# Patient Record
Sex: FEMALE | Race: WHITE | Employment: OTHER | URBAN - METROPOLITAN AREA
[De-identification: names, ages, dates, MRNs, and addresses within clinical notes are randomized per-mention and may not be internally consistent; named-entity substitution may affect disease eponyms.]

---

## 2022-08-04 ENCOUNTER — APPOINTMENT (OUTPATIENT)
Dept: GENERAL RADIOLOGY | Age: 87
End: 2022-08-04
Payer: MEDICARE

## 2022-08-04 ENCOUNTER — APPOINTMENT (OUTPATIENT)
Dept: CT IMAGING | Age: 87
End: 2022-08-04
Payer: MEDICARE

## 2022-08-04 ENCOUNTER — HOSPITAL ENCOUNTER (EMERGENCY)
Age: 87
Discharge: HOME | End: 2022-08-04
Attending: EMERGENCY MEDICINE
Payer: MEDICARE

## 2022-08-04 VITALS
DIASTOLIC BLOOD PRESSURE: 83 MMHG | BODY MASS INDEX: 18.52 KG/M2 | HEIGHT: 55 IN | HEART RATE: 66 BPM | TEMPERATURE: 97.6 F | WEIGHT: 80 LBS | RESPIRATION RATE: 18 BRPM | OXYGEN SATURATION: 96 % | SYSTOLIC BLOOD PRESSURE: 143 MMHG

## 2022-08-04 DIAGNOSIS — S06.0X1A CONCUSSION WITH LOSS OF CONSCIOUSNESS OF 30 MINUTES OR LESS, INITIAL ENCOUNTER: Primary | ICD-10-CM
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
## 2022-08-04 DIAGNOSIS — M21.822 HILL SACHS DEFORMITY, LEFT: ICD-10-CM

## 2022-08-04 DIAGNOSIS — S12.9XXA COMPRESSION FRACTURE OF CERVICAL VERTEBRA, UNSPECIFIED CERVICAL VERTEBRAL LEVEL, INITIAL ENCOUNTER (HCC): ICD-10-CM

## 2022-08-04 DIAGNOSIS — M25.512 ACUTE PAIN OF LEFT SHOULDER: ICD-10-CM

## 2022-08-04 PROCEDURE — 6370000000 HC RX 637 (ALT 250 FOR IP): Performed by: EMERGENCY MEDICINE

## 2022-08-04 PROCEDURE — 73562 X-RAY EXAM OF KNEE 3: CPT

## 2022-08-04 PROCEDURE — 72125 CT NECK SPINE W/O DYE: CPT

## 2022-08-04 PROCEDURE — 99284 EMERGENCY DEPT VISIT MOD MDM: CPT

## 2022-08-04 PROCEDURE — 73080 X-RAY EXAM OF ELBOW: CPT

## 2022-08-04 PROCEDURE — 73030 X-RAY EXAM OF SHOULDER: CPT

## 2022-08-04 PROCEDURE — 70450 CT HEAD/BRAIN W/O DYE: CPT

## 2022-08-04 RX ORDER — HYDROCODONE BITARTRATE AND ACETAMINOPHEN 5; 325 MG/1; MG/1
1 TABLET ORAL ONCE
Status: COMPLETED | OUTPATIENT
Start: 2022-08-04 | End: 2022-08-04

## 2022-08-04 RX ORDER — METOPROLOL SUCCINATE 50 MG/1
50 TABLET, EXTENDED RELEASE ORAL DAILY
COMMUNITY

## 2022-08-04 RX ORDER — HYDROCODONE BITARTRATE AND ACETAMINOPHEN 5; 325 MG/1; MG/1
1 TABLET ORAL EVERY 6 HOURS PRN
Qty: 6 TABLET | Refills: 0 | Status: SHIPPED | OUTPATIENT
Start: 2022-08-04 | End: 2022-08-07

## 2022-08-04 RX ORDER — SPIRONOLACTONE 50 MG/1
50 TABLET, FILM COATED ORAL DAILY
COMMUNITY

## 2022-08-04 RX ADMIN — HYDROCODONE BITARTRATE AND ACETAMINOPHEN 1 TABLET: 5; 325 TABLET ORAL at 20:01

## 2022-08-04 ASSESSMENT — ENCOUNTER SYMPTOMS
ABDOMINAL PAIN: 0
SHORTNESS OF BREATH: 0
CHEST TIGHTNESS: 0

## 2022-08-04 ASSESSMENT — PAIN DESCRIPTION - LOCATION: LOCATION: SHOULDER

## 2022-08-04 ASSESSMENT — PAIN - FUNCTIONAL ASSESSMENT: PAIN_FUNCTIONAL_ASSESSMENT: NONE - DENIES PAIN

## 2022-08-04 ASSESSMENT — PAIN SCALES - GENERAL: PAINLEVEL_OUTOF10: 4

## 2022-08-04 NOTE — ED TRIAGE NOTES
[place] mall, wheel caught rug and pt fell and hit head on floor. little pain at left shoulder denies pain elsewhere however daughter states she will always deny pain. Abrasions noted to left elbow and knee.

## 2022-08-04 NOTE — ED PROVIDER NOTES
66-year-old female presenting after a fall that occurred. She was being wheeled on her wheeled walker by her daughter. The wheel caught on a rug at [facility]. Patient fell and struck the left side of her head. She has an abrasion left elbow and pain the left knee as well. Awake, alert and oriented x4. Not complaining of anything now but when directly asked about pain she does have some. She is awake and alert now. This new problem, persistent, severity, ongoing pain for about an hour to     No family history on file. No past surgical history on file. Review of Systems   Constitutional:  Negative for chills and fever. HENT:          Left-sided head injury   Respiratory:  Negative for chest tightness and shortness of breath. Cardiovascular:  Negative for chest pain. Gastrointestinal:  Negative for abdominal pain. Musculoskeletal:         Left elbow and the abrasions, pain to the left shoulder   All other systems reviewed and are negative. Physical Exam  Constitutional:       General: She is not in acute distress. Appearance: She is well-developed. HENT:      Head: Normocephalic. Comments: Ecchymotic area to left side of the head  Eyes:      Conjunctiva/sclera: Conjunctivae normal.      Pupils: Pupils are equal, round, and reactive to light. Neck:      Thyroid: No thyromegaly. Cardiovascular:      Rate and Rhythm: Normal rate and regular rhythm. Pulmonary:      Effort: Pulmonary effort is normal. No respiratory distress. Breath sounds: Normal breath sounds. Abdominal:      General: There is no distension. Palpations: Abdomen is soft. Tenderness: There is no abdominal tenderness. There is no guarding or rebound. Musculoskeletal:         General: Tenderness present. Cervical back: Normal range of motion.       Comments: Tenderness to the shoulder, abrasion to left elbow without significant pain, abrasion left knee without significant pain   Skin: General: Skin is warm and dry. Findings: No erythema. Neurological:      Mental Status: She is alert and oriented to person, place, and time. Cranial Nerves: No cranial nerve deficit. Coordination: Coordination normal.        Procedures     MDM              --------------------------------------------- PAST HISTORY ---------------------------------------------  Past Medical History:  has a past medical history of Cerebral artery occlusion with cerebral infarction ([facility] and [facility] Past Surgical History:  has no past surgical history on file. Social History:  reports that she has never smoked. She has never used smokeless tobacco. She reports that she does not drink alcohol and does not use drugs. Family History: family history is not on file. The patients home medications have been reviewed. Allergies: Patient has no known allergies. -------------------------------------------------- RESULTS -------------------------------------------------  Labs:  No results found for this visit on 08/04/22. Radiology:  CT HEAD WO CONTRAST   Final Result   No acute intracranial abnormality. There is age-appropriate atrophy,   small-vessel ischemic disease and multiple old lacunar CVAs which include   with caudate nucleus and right basal ganglia. CT CERVICAL SPINE WO CONTRAST   Final Result   Mild-moderate T1 and T4 as well as mild T3 anterior compression fractures,   which are age indeterminate. No unstable cervical spine fracture identified. Moderate-severe cervical spondylosis. XR SHOULDER LEFT (MIN 2 VIEWS)   Final Result   1. No acute abnormality. 2. 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 3. Demineralized bones. 4. Findings suggestive of chronic rotator cuff tears.          XR KNEE LEFT (3 VIEWS)   Final Result   Addendum (preliminary) 1 of 1   ADDENDUM:   Please disregard previous report which is for the left elbow. Radiographs of the left knee reveal no evidence of fracture or joint   dislocation. Mild loss of joint space is noted at the patellofemoral    joint. Mild chondrocalcinosis noted in the tibiofemoral joints. Final   No acute osseous abnormality. IMPRESSION:   1. No fracture or joint dislocation is seen in the left knee. 2. Degenerative changes, as described. XR ELBOW LEFT (MIN 3 VIEWS)   Final Result   No fracture or joint dislocation.             ------------------------- NURSING NOTES AND VITALS REVIEWED ---------------------------  Date / Time Roomed:  8/4/2022  4:39 PM  ED Bed Assignment:  13/13    The nursing notes within the ED encounter and vital signs as below have been reviewed. BP (!) 143/83   Pulse 66   Temp 97.6 °F (36.4 °C) (Oral)   Resp 18   Ht 4' 5\" (1.346 m)   Wt 80 lb (36.3 kg)   LMP  (LMP Unknown)   SpO2 96%   BMI 20.02 kg/m²   Oxygen Saturation Interpretation: Normal      ------------------------------------------ PROGRESS NOTES ------------------------------------------  I have spoken with the patient and daughter  and discussed todays results, in addition to providing specific details for the plan of care and counseling regarding the diagnosis and prognosis. Their questions are answered at this time and they are agreeable with the plan. I discussed at length with them reasons for immediate return here for re evaluation. They will followup with primary care by calling their office tomorrow. --------------------------------- ADDITIONAL PROVIDER NOTES ---------------------------------  At this time the patient is without objective evidence of an acute process requiring hospitalization or inpatient management. They have remained hemodynamically stable throughout their entire ED visit and are stable for discharge with outpatient follow-up.      The plan has been discussed in detail and they are aware of the specific conditions for emergent return, as well as the importance of follow-up. Discharge Medication List as of 8/4/2022  8:41 PM        START taking these medications    Details   HYDROcodone-acetaminophen (NORCO) 5-325 MG per tablet Take 1 tablet by mouth every 6 hours as needed for Pain for up to 3 days. , Disp-6 tablet, R-0Normal             Diagnosis:  1. Concussion with loss of consciousness of 30 minutes or less, initial encounter    2. Fall, initial encounter    3. Compression fracture of cervical vertebra, unspecified cervical vertebral level, initial encounter ([facility]    4. Acute pain of left shoulder    5. Hill Sachs deformity, left        Disposition:  Patient's disposition: Discharge to home  Patient's condition is stable.          Autumn Guthrie DO  08/04/22 1257

## 2022-08-12 ENCOUNTER — OFFICE VISIT (OUTPATIENT)
Dept: FAMILY MEDICINE CLINIC | Age: 87
End: 2022-08-12
Payer: MEDICARE

## 2022-08-12 VITALS
HEIGHT: 55 IN | TEMPERATURE: 98.4 F | HEART RATE: 69 BPM | SYSTOLIC BLOOD PRESSURE: 118 MMHG | OXYGEN SATURATION: 95 % | DIASTOLIC BLOOD PRESSURE: 62 MMHG | RESPIRATION RATE: 17 BRPM | WEIGHT: 78.2 LBS | BODY MASS INDEX: 18.1 KG/M2

## 2022-08-12 DIAGNOSIS — J44.9 CHRONIC OBSTRUCTIVE PULMONARY DISEASE, UNSPECIFIED COPD TYPE (HCC): ICD-10-CM

## 2022-08-12 DIAGNOSIS — S06.0X1D CONCUSSION WITH LOSS OF CONSCIOUSNESS OF 30 MINUTES OR LESS, SUBSEQUENT ENCOUNTER: Primary | ICD-10-CM

## 2022-08-12 PROCEDURE — 1123F ACP DISCUSS/DSCN MKR DOCD: CPT | Performed by: FAMILY MEDICINE

## 2022-08-12 PROCEDURE — 99203 OFFICE O/P NEW LOW 30 MIN: CPT | Performed by: FAMILY MEDICINE

## 2022-08-12 RX ORDER — FUROSEMIDE 40 MG/1
TABLET ORAL
COMMUNITY
Start: 2022-05-19

## 2022-08-12 RX ORDER — BUDESONIDE 0.5 MG/2ML
INHALANT ORAL
COMMUNITY
Start: 2022-05-19

## 2022-08-12 RX ORDER — ALBUTEROL SULFATE 90 UG/1
2 AEROSOL, METERED RESPIRATORY (INHALATION) EVERY 6 HOURS PRN
Qty: 18 G | Refills: 0 | Status: SHIPPED | OUTPATIENT
Start: 2022-08-12

## 2022-08-12 RX ORDER — ALBUTEROL SULFATE 90 UG/1
AEROSOL, METERED RESPIRATORY (INHALATION)
COMMUNITY
Start: 2021-09-28 | End: 2022-08-12 | Stop reason: SDUPTHER

## 2022-08-12 RX ORDER — IPRATROPIUM BROMIDE AND ALBUTEROL SULFATE 2.5; .5 MG/3ML; MG/3ML
SOLUTION RESPIRATORY (INHALATION)
COMMUNITY
Start: 2022-05-24

## 2022-08-12 RX ORDER — PHENOL 1.4 %
AEROSOL, SPRAY (ML) MUCOUS MEMBRANE
COMMUNITY

## 2022-08-12 SDOH — ECONOMIC STABILITY: FOOD INSECURITY: WITHIN THE PAST 12 MONTHS, YOU WORRIED THAT YOUR FOOD WOULD RUN OUT BEFORE YOU GOT MONEY TO BUY MORE.: NEVER TRUE

## 2022-08-12 SDOH — ECONOMIC STABILITY: FOOD INSECURITY: WITHIN THE PAST 12 MONTHS, THE FOOD YOU BOUGHT JUST DIDN'T LAST AND YOU DIDN'T HAVE MONEY TO GET MORE.: NEVER TRUE

## 2022-08-12 ASSESSMENT — ENCOUNTER SYMPTOMS
CHEST TIGHTNESS: 0
EYE DISCHARGE: 0
SINUS PAIN: 0
DIARRHEA: 0
NAUSEA: 0
TROUBLE SWALLOWING: 0
ABDOMINAL PAIN: 0
SORE THROAT: 0
WHEEZING: 0
SHORTNESS OF BREATH: 0
COUGH: 1
CONSTIPATION: 0
EYE REDNESS: 0

## 2022-08-12 ASSESSMENT — PATIENT HEALTH QUESTIONNAIRE - PHQ9
SUM OF ALL RESPONSES TO PHQ QUESTIONS 1-9: 0
SUM OF ALL RESPONSES TO PHQ QUESTIONS 1-9: 0
2. FEELING DOWN, DEPRESSED OR HOPELESS: 0
SUM OF ALL RESPONSES TO PHQ QUESTIONS 1-9: 0
1. LITTLE INTEREST OR PLEASURE IN DOING THINGS: 0
SUM OF ALL RESPONSES TO PHQ QUESTIONS 1-9: 0
SUM OF ALL RESPONSES TO PHQ9 QUESTIONS 1 & 2: 0

## 2022-08-12 ASSESSMENT — SOCIAL DETERMINANTS OF HEALTH (SDOH): HOW HARD IS IT FOR YOU TO PAY FOR THE VERY BASICS LIKE FOOD, HOUSING, MEDICAL CARE, AND HEATING?: NOT HARD AT ALL

## 2022-08-12 NOTE — PROGRESS NOTES
22  Doug Gordon : 1935 Sex: female  Age: 80 y.o. Chief Complaint   Patient presents with    ED Follow-up     Sarina Fletcherow out of wheelchair and hit head    New Patient       HPI this pleasant 26-year-old woman is here today brought in by her daughter as a follow-up to a concussion with loss of consciousness injury on . The emergency room she had negative CT of the head and neck but the ER doctor recommended she have a follow-up MRI. At this point she is still complaining of headaches and her daughter says that she is restless and her sleep is affected. I will go ahead and order the MRI. I also refilled her albuterol which she takes for her COPD at times. The original injury was seen at [facility] [place] [facility], they live in [place] which is about an hour away or almost an hour. They told me they are going to get a physician closer to home, apparently the call center gave them this appointment here. Review of Systems   Constitutional:  Positive for fatigue. Negative for diaphoresis, fever and unexpected weight change. HENT:  Negative for congestion, ear discharge, sinus pain, sore throat and trouble swallowing. Eyes:  Negative for discharge and redness. Respiratory:  Positive for cough. Negative for chest tightness, shortness of breath and wheezing. Cardiovascular:  Negative for chest pain, palpitations and leg swelling. Gastrointestinal:  Negative for abdominal pain, constipation, diarrhea and nausea. Endocrine: Negative for polydipsia and polyuria. Genitourinary:  Negative for dysuria, flank pain, frequency and urgency. Musculoskeletal:  Negative for arthralgias and myalgias. Skin:  Negative for rash. Allergic/Immunologic: Negative for immunocompromised state. Neurological:  Positive for headaches. Negative for dizziness, syncope and numbness. Hematological:  Does not bruise/bleed easily.    Psychiatric/Behavioral:  Negative for sleep disturbance and suicidal ideas. The patient is not nervous/anxious. Physical Exam  HENT:      Head: Normocephalic. Nose: Nose normal.      Mouth/Throat:      Mouth: Mucous membranes are moist.   Eyes:      Extraocular Movements: Extraocular movements intact. Pupils: Pupils are equal, round, and reactive to light. Cardiovascular:      Rate and Rhythm: Normal rate. Pulses: Normal pulses. Pulmonary:      Effort: Pulmonary effort is normal.      Breath sounds: Normal breath sounds. Skin:     General: Skin is warm and dry. Neurological:      Mental Status: She is alert and oriented to person, place, and time. Cranial Nerves: No cranial nerve deficit. Psychiatric:         Behavior: Behavior normal.       Assessment and Plan:  Violet was seen today for ed follow-up and new patient. Diagnoses and all orders for this visit:    Concussion with loss of consciousness of 30 minutes or less, subsequent encounter  -     MRI BRAIN WO CONTRAST; Future    Chronic obstructive pulmonary disease, unspecified COPD type ([facility] [address]    Other orders  -     albuterol sulfate HFA (PROVENTIL;VENTOLIN;PROAIR) 108 (90 Base) MCG/ACT inhaler; Inhale 2 puffs into the lungs every 6 hours as needed for Wheezing          Discussions/Education provided to patients during visit:  [] Discussed the importance to stop smoking. [] Advised to monitor eating habits. [] Reviewed and discussed Imaging results. [] Reviewed and discussed Lab results. [] Discussed the importance of drinking plenty of fluids. [] Cut down on Salt, Caffeine, and Sugar. [x] Continue Medications as Discussed. [x] Communicated with patient any concerns, to phone office. No follow-ups on file.       Seen By:  Twila Duron,

## 2022-08-27 ENCOUNTER — HOSPITAL ENCOUNTER (OUTPATIENT)
Age: 87
Discharge: HOME | End: 2022-08-27
Payer: MEDICARE

## 2022-08-27 ENCOUNTER — HOSPITAL ENCOUNTER (OUTPATIENT)
Dept: GENERAL RADIOLOGY | Age: 87
Discharge: HOME | End: 2022-08-29
Payer: MEDICARE

## 2022-08-27 ENCOUNTER — HOSPITAL ENCOUNTER (OUTPATIENT)
Age: 87
Discharge: HOME | End: 2022-08-29
Payer: MEDICARE

## 2022-08-27 DIAGNOSIS — R05.9 COUGH: ICD-10-CM

## 2022-08-27 PROCEDURE — 93005 ELECTROCARDIOGRAM TRACING: CPT | Performed by: INTERNAL MEDICINE

## 2022-08-27 PROCEDURE — 71046 X-RAY EXAM CHEST 2 VIEWS: CPT

## 2022-08-28 LAB
EKG ATRIAL RATE: 66 BPM
EKG P AXIS: 80 DEGREES
EKG P-R INTERVAL: 182 MS
EKG Q-T INTERVAL: 426 MS
EKG QRS DURATION: 92 MS
EKG QTC CALCULATION (BAZETT): 446 MS
EKG R AXIS: -16 DEGREES
EKG T AXIS: 89 DEGREES
EKG VENTRICULAR RATE: 66 BPM

## 2022-09-22 ENCOUNTER — TELEPHONE (OUTPATIENT)
Dept: FAMILY MEDICINE CLINIC | Age: 87
End: 2022-09-22

## 2022-09-22 NOTE — TELEPHONE ENCOUNTER
Patient's daughter, Alfonso Hinton, called on behalf of patient and herself, informing they just moved to the area and would like to know if they could both schedule w/Dr. Jai Groves. I informed that I will need to send a msg to Dr. Christy Maloney, asking if he will accept them both. Juancho Pierce was agreeable. Juancho Pierce was not in the [facility]) system, therefore, I directed her to the Call Center to create a chart.

## 2022-09-23 NOTE — TELEPHONE ENCOUNTER
I returned patient's daughter, Kylah, call and informed OK to schedule both. I informed that Dr. Dominic Price will not prescribe any controlled medications for pain, anxiety or sleep. Adriana Fuller informed she takes Clonazepam.  I informed that Dr. Dominic Price will not prescribe this. Adriana Fuller stated she will look into another PCP.

## 2022-09-29 ENCOUNTER — OFFICE VISIT (OUTPATIENT)
Dept: PRIMARY CARE CLINIC | Age: 87
End: 2022-09-29
Payer: MEDICARE

## 2022-09-29 VITALS
DIASTOLIC BLOOD PRESSURE: 50 MMHG | BODY MASS INDEX: 18.03 KG/M2 | SYSTOLIC BLOOD PRESSURE: 110 MMHG | HEIGHT: 55 IN | TEMPERATURE: 97 F | WEIGHT: 77.9 LBS | HEART RATE: 65 BPM | OXYGEN SATURATION: 96 %

## 2022-09-29 DIAGNOSIS — Z86.79 HISTORY OF CORONARY ARTERY DISEASE: ICD-10-CM

## 2022-09-29 DIAGNOSIS — I10 ESSENTIAL HYPERTENSION: ICD-10-CM

## 2022-09-29 DIAGNOSIS — J44.9 CHRONIC OBSTRUCTIVE PULMONARY DISEASE, UNSPECIFIED COPD TYPE (HCC): ICD-10-CM

## 2022-09-29 DIAGNOSIS — R41.89 COGNITIVE IMPAIRMENT: Primary | ICD-10-CM

## 2022-09-29 DIAGNOSIS — R01.1 HEART MURMUR, SYSTOLIC: ICD-10-CM

## 2022-09-29 DIAGNOSIS — R41.89 COGNITIVE IMPAIRMENT: ICD-10-CM

## 2022-09-29 LAB
BACTERIA: ABNORMAL /HPF
BASOPHILS ABSOLUTE: 0.03 E9/L (ref 0–0.2)
BASOPHILS RELATIVE PERCENT: 0.4 % (ref 0–2)
BILIRUBIN URINE: NEGATIVE
BLOOD, URINE: NEGATIVE
CLARITY: ABNORMAL
COLOR: YELLOW
CRYSTALS, UA: ABNORMAL /HPF
EOSINOPHILS ABSOLUTE: 0.16 E9/L (ref 0.05–0.5)
EOSINOPHILS RELATIVE PERCENT: 2.1 % (ref 0–6)
GLUCOSE URINE: NEGATIVE MG/DL
HCT VFR BLD CALC: 39.5 % (ref 34–48)
HEMOGLOBIN: 13.1 G/DL (ref 11.5–15.5)
IMMATURE GRANULOCYTES #: 0.03 E9/L
IMMATURE GRANULOCYTES %: 0.4 % (ref 0–5)
KETONES, URINE: ABNORMAL MG/DL
LEUKOCYTE ESTERASE, URINE: ABNORMAL
LYMPHOCYTES ABSOLUTE: 1.43 E9/L (ref 1.5–4)
LYMPHOCYTES RELATIVE PERCENT: 18.4 % (ref 20–42)
MCH RBC QN AUTO: 33.4 PG (ref 26–35)
MCHC RBC AUTO-ENTMCNC: 33.2 % (ref 32–34.5)
MCV RBC AUTO: 100.8 FL (ref 80–99.9)
MONOCYTES ABSOLUTE: 0.83 E9/L (ref 0.1–0.95)
MONOCYTES RELATIVE PERCENT: 10.7 % (ref 2–12)
NEUTROPHILS ABSOLUTE: 5.3 E9/L (ref 1.8–7.3)
NEUTROPHILS RELATIVE PERCENT: 68 % (ref 43–80)
NITRITE, URINE: NEGATIVE
PDW BLD-RTO: 13.3 FL (ref 11.5–15)
PH UA: 6 (ref 5–9)
PLATELET # BLD: 251 E9/L (ref 130–450)
PMV BLD AUTO: 10.1 FL (ref 7–12)
PROTEIN UA: NEGATIVE MG/DL
RBC # BLD: 3.92 E12/L (ref 3.5–5.5)
RBC UA: ABNORMAL /HPF (ref 0–2)
SPECIFIC GRAVITY UA: 1.02 (ref 1–1.03)
UROBILINOGEN, URINE: 0.2 E.U./DL
WBC # BLD: 7.8 E9/L (ref 4.5–11.5)
WBC UA: ABNORMAL /HPF (ref 0–5)

## 2022-09-29 PROCEDURE — 99205 OFFICE O/P NEW HI 60 MIN: CPT | Performed by: INTERNAL MEDICINE

## 2022-09-29 PROCEDURE — 81003 URINALYSIS AUTO W/O SCOPE: CPT | Performed by: INTERNAL MEDICINE

## 2022-09-29 PROCEDURE — 1123F ACP DISCUSS/DSCN MKR DOCD: CPT | Performed by: INTERNAL MEDICINE

## 2022-09-29 RX ORDER — DONEPEZIL HYDROCHLORIDE 10 MG/1
10 TABLET, FILM COATED ORAL NIGHTLY
Qty: 30 TABLET | Refills: 0 | Status: SHIPPED | OUTPATIENT
Start: 2022-09-29

## 2022-09-29 NOTE — PROGRESS NOTES
Chief Complaint   Patient presents with    New Patient     To be established here locally was being seen in [address] [facility]. Dr. Fernanda Lopes Cardiology   1. Would like referral to local cardiology       HPI:  Patient is here to be established as a new patient she moved to this area from [facility]  Patient has a history of COPD, from secondhand smoke and according to the daughter progressive supra nuclear palsy a CVA . Daughter told me that cardiologist saw her as an outpatient looked at her EKG and told them that she had a heart attack while she was in the hospital before. Patient denies any chest pain or shortness of breath  Patient apparently fell on August 4 while getting up from her wheelchair to the left side, hit her head was out for few minutes was evaluated at [facility] facility emergency room. .    Past Medical History, Surgical History, and Family History has been reviewed and updated.     Review of Systems:  Constitutional:  No fever, no fatigue, no chills, no headaches, no weight change  Dermatology:  No rash, no mole, no dry or sensitive skin  ENT:  No cough, no sore throat, no sinus pain, no runny nose, no ear pain  Cardiology:  No chest pain, no palpitations, no leg edema, no shortness of breath, no PND  Gastroenterology:  No dysphagia, no abdominal pain, no nausea, no vomiting, no constipation, no diarrhea, no heartburn  Musculoskeletal:  No joint pain, no leg cramps, no back pain, no muscle aches  Respiratory:  No shortness of breath, no orthopnea, no wheezing, no DORSEY, no hemoptysis  Urology:  No blood in the urine, no urinary frequency, no urinary incontinence, no urinary urgency, no nocturia, no dysuria    Vitals:    09/29/22 1141   BP: (!) 110/50   Site: Right Upper Arm   Position: Sitting   Cuff Size: Large Adult   Pulse: 65   Temp: 97 °F (36.1 °C)   SpO2: 96%   Weight: 77 lb 14.4 oz (35.3 kg)   Height: 4' 5\" (1.346 m)       General:  Patient alert pleasant and oriented date and year unable to recall the name of the president can only recall one of her 4 kids name and number of her grandchildren names  HEENT:  Atraumatic, normocephalic, PERRLA, EOMI, clear conjunctiva, TMs clear, nose-clear, throat - no erythema  Neck:  Supple, no goiter, no carotid bruits, no LAD  Lungs: Bilateral inspiratory fibrotic rales no wheezes. Resonant to percussion  Heart:  RRR, + ejection systolic murmur at the right and left sternal border, NO gallops or rubs  Abdomen:  Soft/nt/nd, + bowel sounds  Extremities:  No clubbing, cyanosis or edema  Neuro exam: Muscle wasting in 4 extremities. Positive finger-to-nose test on the right side. Can only walk with assistance  Cranial nerves intact  Skin: unremarkable    No results found for: LABA1C, CHOL, TRIG, HDL, LDLCHOLESTEROL, LDLCALC, LABVLDL, VLDL, CHOLHDLRATIO, NA, K, CL, CO2, BUN, CREATININE, GLUCOSE, CALCIUM, PROT, LABALBU, BILITOT, ALKPHOS, AST, ALT, LABGLOM, GFRAA, AGRATIO, GLOB     MRI BRAIN WO CONTRAST    Result Date: 8/31/2022  EXAMINATION: MRI OF THE BRAIN WITHOUT CONTRAST  8/31/2022 5:35 pm TECHNIQUE: Multiplanar multisequence MRI of the brain was performed without the administration of intravenous contrast. COMPARISON: CT head without contrast, 08/04/2022 HISTORY: ORDERING SYSTEM PROVIDED HISTORY: Concussion with loss of consciousness of 30 minutes or less, subsequent encounter TECHNOLOGIST PROVIDED HISTORY: Reason for exam:->continued headache, FINDINGS: INTRACRANIAL STRUCTURES/VENTRICLES: There is no acute infarct. No mass effect, edema or hemorrhage is seen. Mild-to-moderate cerebral volume loss is seen with moderate chronic microvascular ischemic changes. 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 No hydrocephalus or extra-axial fluid is seen. ORBITS: Prosthetic lenses are seen in the globes bilaterally. The orbits are otherwise grossly unremarkable.  SINUSES: The visualized paranasal sinuses and mastoid air cells demonstrate no acute abnormality. BONES/SOFT TISSUES: The bone marrow signal intensity appears normal. The soft tissues demonstrate no acute abnormality. 1.  No acute intracranial abnormality. 2. Mild-to-moderate cerebral volume loss with moderate chronic microvascular ischemic changes. 3. Vertebrobasilar dolichoectasia. RECOMMENDATIONS: Unavailable        Assessment/Plan:    Cognitive impairment. MMSE done scored 12 out of 30. Start Aricept 10 mg tablet daily  COPD  Essential hypertension controlled  History of CVA  History of coronary artery disease      Outpatient Encounter Medications as of 9/29/2022   Medication Sig Dispense Refill    donepezil (ARICEPT) 10 MG tablet Take 1 tablet by mouth nightly 30 tablet 0    budesonide (PULMICORT) 0.5 MG/2ML nebulizer suspension USE 1 VIAL IN NEBULIZER TWICE DAILY      furosemide (LASIX) 40 MG tablet TAKE 1 TABLET BY MOUTH TWICE DAILY      ipratropium-albuterol (DUONEB) 0.5-2.5 (3) MG/3ML SOLN nebulizer solution USE 1 AMPULE IN NEBULIZER EVERY 6 HOURS AS NEEDED FOR SHORTNESS OF BREATH      Melatonin 10 MG TABS Take by mouth      albuterol sulfate HFA (PROVENTIL;VENTOLIN;PROAIR) 108 (90 Base) MCG/ACT inhaler Inhale 2 puffs into the lungs every 6 hours as needed for Wheezing 18 g 0    metoprolol succinate (TOPROL XL) 50 MG extended release tablet Take 50 mg by mouth in the morning. spironolactone (ALDACTONE) 50 MG tablet Take 50 mg by mouth in the morning. No facility-administered encounter medications on file as of 9/29/2022. Violet was seen today for new patient. Diagnoses and all orders for this visit:    Cognitive impairment  -     donepezil (ARICEPT) 10 MG tablet; Take 1 tablet by mouth nightly  -     CBC with Auto Differential; Future  -     Comprehensive Metabolic Panel; Future  -     TSH; Future    History of coronary artery disease  -     Yadira Saleh MD, Cardiology, [facility]  -     Echocardiogram complete; Future  -     LIPID PANEL;  Future  - Urinalysis; Future    Heart murmur, systolic  -     Jelly Osborne MD, Cardiology, [place]  -     Echocardiogram complete; Future    Chronic obstructive pulmonary disease, unspecified [facility]  -     CBC with Auto Differential; Future  -     Comprehensive Metabolic Panel; Future         There are no Patient Instructions on file for this visit.            Lawson Pallas, MD   9/29/22

## 2022-09-30 LAB
ALBUMIN SERPL-MCNC: 3.9 G/DL (ref 3.5–5.2)
ALP BLD-CCNC: 73 U/L (ref 35–104)
ALT SERPL-CCNC: 15 U/L (ref 0–32)
ANION GAP SERPL CALCULATED.3IONS-SCNC: 11 MMOL/L (ref 7–16)
AST SERPL-CCNC: 22 U/L (ref 0–31)
BILIRUB SERPL-MCNC: 0.3 MG/DL (ref 0–1.2)
BUN BLDV-MCNC: 21 MG/DL (ref 6–23)
CALCIUM SERPL-MCNC: 9.7 MG/DL (ref 8.6–10.2)
CHLORIDE BLD-SCNC: 103 MMOL/L (ref 98–107)
CHOLESTEROL, TOTAL: 119 MG/DL (ref 0–199)
CO2: 26 MMOL/L (ref 22–29)
CREAT SERPL-MCNC: 0.9 MG/DL (ref 0.5–1)
GFR AFRICAN AMERICAN: >60
GFR NON-AFRICAN AMERICAN: 59 ML/MIN/1.73
GLUCOSE BLD-MCNC: 96 MG/DL (ref 74–99)
HDLC SERPL-MCNC: 40 MG/DL
LDL CHOLESTEROL CALCULATED: 65 MG/DL (ref 0–99)
POTASSIUM SERPL-SCNC: 4.9 MMOL/L (ref 3.5–5)
SODIUM BLD-SCNC: 140 MMOL/L (ref 132–146)
TOTAL PROTEIN: 6.4 G/DL (ref 6.4–8.3)
TRIGL SERPL-MCNC: 70 MG/DL (ref 0–149)
TSH SERPL DL<=0.05 MIU/L-ACNC: 1.88 UIU/ML (ref 0.27–4.2)
VLDLC SERPL CALC-MCNC: 14 MG/DL

## 2022-11-22 ENCOUNTER — TELEPHONE (OUTPATIENT)
Dept: PRIMARY CARE CLINIC | Age: 87
End: 2022-11-22

## 2022-11-22 NOTE — TELEPHONE ENCOUNTER
----- Message from Aidan Salguero sent at 11/22/2022 12:50 PM EST -----  Subject: Results Request    QUESTIONS  Results: Would like results of Mri of the brain to be sent to her   cardiologist phone: [phone] (Dr. Jasmyne Minaya) they would   also like the results interpreted over the phone to them as well. ;   Ordered by:   Date Performed:   ---------------------------------------------------------------------------  --------------  Meredith DUNBAR    [phone]; OK to leave message on voicemail  ---------------------------------------------------------------------------  --------------

## 2023-03-17 ENCOUNTER — APPOINTMENT (OUTPATIENT)
Dept: GENERAL RADIOLOGY | Age: 88
End: 2023-03-17
Payer: MEDICARE

## 2023-03-17 ENCOUNTER — HOSPITAL ENCOUNTER (EMERGENCY)
Age: 88
Discharge: LEFT BEFORE DISCHARGE | End: 2023-03-17
Attending: EMERGENCY MEDICINE
Payer: MEDICARE

## 2023-03-17 ENCOUNTER — APPOINTMENT (OUTPATIENT)
Dept: CT IMAGING | Age: 88
End: 2023-03-17
Payer: MEDICARE

## 2023-03-17 VITALS
WEIGHT: 69.8 LBS | SYSTOLIC BLOOD PRESSURE: 120 MMHG | TEMPERATURE: 98 F | DIASTOLIC BLOOD PRESSURE: 62 MMHG | HEART RATE: 72 BPM | HEIGHT: 55 IN | RESPIRATION RATE: 20 BRPM | OXYGEN SATURATION: 98 % | BODY MASS INDEX: 16.15 KG/M2

## 2023-03-17 DIAGNOSIS — E86.0 DEHYDRATION: ICD-10-CM

## 2023-03-17 DIAGNOSIS — R41.0 CONFUSION: ICD-10-CM

## 2023-03-17 DIAGNOSIS — Z53.29 LEFT AGAINST MEDICAL ADVICE: Primary | ICD-10-CM

## 2023-03-17 DIAGNOSIS — N17.9 AKI (ACUTE KIDNEY INJURY) (HCC): ICD-10-CM

## 2023-03-17 LAB
ALBUMIN SERPL-MCNC: 4.1 G/DL (ref 3.5–5.2)
ALP SERPL-CCNC: 105 U/L (ref 35–104)
ALT SERPL-CCNC: 17 U/L (ref 0–32)
ANION GAP SERPL CALCULATED.3IONS-SCNC: 12 MMOL/L (ref 7–16)
AST SERPL-CCNC: 23 U/L (ref 0–31)
BACTERIA URNS QL MICRO: ABNORMAL /HPF
BASOPHILS # BLD: 0.03 E9/L (ref 0–0.2)
BASOPHILS NFR BLD: 0.4 % (ref 0–2)
BILIRUB SERPL-MCNC: 0.3 MG/DL (ref 0–1.2)
BILIRUB UR QL STRIP: NEGATIVE
BUN SERPL-MCNC: 66 MG/DL (ref 6–23)
CALCIUM SERPL-MCNC: 9.6 MG/DL (ref 8.6–10.2)
CHLORIDE SERPL-SCNC: 105 MMOL/L (ref 98–107)
CLARITY UR: CLEAR
CO2 SERPL-SCNC: 25 MMOL/L (ref 22–29)
COLOR UR: YELLOW
CREAT SERPL-MCNC: 1.5 MG/DL (ref 0.5–1)
EOSINOPHIL # BLD: 0.08 E9/L (ref 0.05–0.5)
EOSINOPHIL NFR BLD: 1 % (ref 0–6)
EPI CELLS #/AREA URNS HPF: ABNORMAL /HPF
ERYTHROCYTE [DISTWIDTH] IN BLOOD BY AUTOMATED COUNT: 12.7 FL (ref 11.5–15)
GLUCOSE SERPL-MCNC: 212 MG/DL (ref 74–99)
GLUCOSE UR STRIP-MCNC: NEGATIVE MG/DL
HCT VFR BLD AUTO: 41.8 % (ref 34–48)
HGB BLD-MCNC: 13.6 G/DL (ref 11.5–15.5)
HGB UR QL STRIP: NEGATIVE
HYALINE CASTS #/AREA URNS LPF: ABNORMAL /LPF (ref 0–2)
IMM GRANULOCYTES # BLD: 0.05 E9/L
IMM GRANULOCYTES NFR BLD: 0.6 % (ref 0–5)
KETONES UR STRIP-MCNC: NEGATIVE MG/DL
LEUKOCYTE ESTERASE UR QL STRIP: NEGATIVE
LYMPHOCYTES # BLD: 0.86 E9/L (ref 1.5–4)
LYMPHOCYTES NFR BLD: 10.9 % (ref 20–42)
MCH RBC QN AUTO: 33.2 PG (ref 26–35)
MCHC RBC AUTO-ENTMCNC: 32.5 % (ref 32–34.5)
MCV RBC AUTO: 102 FL (ref 80–99.9)
MONOCYTES # BLD: 0.62 E9/L (ref 0.1–0.95)
MONOCYTES NFR BLD: 7.9 % (ref 2–12)
NEUTROPHILS # BLD: 6.25 E9/L (ref 1.8–7.3)
NEUTS SEG NFR BLD: 79.2 % (ref 43–80)
NITRITE UR QL STRIP: NEGATIVE
PH UR STRIP: 5.5 [PH] (ref 5–9)
PLATELET # BLD AUTO: 238 E9/L (ref 130–450)
PMV BLD AUTO: 10.2 FL (ref 7–12)
POTASSIUM SERPL-SCNC: 3.6 MMOL/L (ref 3.5–5)
PROT SERPL-MCNC: 6.5 G/DL (ref 6.4–8.3)
PROT UR STRIP-MCNC: NEGATIVE MG/DL
RBC # BLD AUTO: 4.1 E12/L (ref 3.5–5.5)
RBC #/AREA URNS HPF: ABNORMAL /HPF (ref 0–2)
SODIUM SERPL-SCNC: 142 MMOL/L (ref 132–146)
SP GR UR STRIP: 1.01 (ref 1–1.03)
TROPONIN, HIGH SENSITIVITY: 34 NG/L (ref 0–9)
TROPONIN, HIGH SENSITIVITY: 40 NG/L (ref 0–9)
UROBILINOGEN UR STRIP-ACNC: 0.2 E.U./DL
WBC # BLD: 7.9 E9/L (ref 4.5–11.5)
WBC #/AREA URNS HPF: ABNORMAL /HPF (ref 0–5)

## 2023-03-17 PROCEDURE — 99285 EMERGENCY DEPT VISIT HI MDM: CPT

## 2023-03-17 PROCEDURE — 71045 X-RAY EXAM CHEST 1 VIEW: CPT

## 2023-03-17 PROCEDURE — 2580000003 HC RX 258: Performed by: STUDENT IN AN ORGANIZED HEALTH CARE EDUCATION/TRAINING PROGRAM

## 2023-03-17 PROCEDURE — 70450 CT HEAD/BRAIN W/O DYE: CPT

## 2023-03-17 PROCEDURE — 85025 COMPLETE CBC W/AUTO DIFF WBC: CPT

## 2023-03-17 PROCEDURE — 96360 HYDRATION IV INFUSION INIT: CPT

## 2023-03-17 PROCEDURE — 81001 URINALYSIS AUTO W/SCOPE: CPT

## 2023-03-17 PROCEDURE — 2580000003 HC RX 258

## 2023-03-17 PROCEDURE — 80053 COMPREHEN METABOLIC PANEL: CPT

## 2023-03-17 PROCEDURE — 84484 ASSAY OF TROPONIN QUANT: CPT

## 2023-03-17 RX ORDER — SODIUM CHLORIDE 0.9 % (FLUSH) 0.9 %
SYRINGE (ML) INJECTION
Status: COMPLETED
Start: 2023-03-17 | End: 2023-03-17

## 2023-03-17 RX ORDER — 0.9 % SODIUM CHLORIDE 0.9 %
500 INTRAVENOUS SOLUTION INTRAVENOUS ONCE
Status: COMPLETED | OUTPATIENT
Start: 2023-03-17 | End: 2023-03-17

## 2023-03-17 RX ORDER — FLUTICASONE PROPIONATE AND SALMETEROL 250; 50 UG/1; UG/1
1 POWDER RESPIRATORY (INHALATION) EVERY 12 HOURS
COMMUNITY

## 2023-03-17 RX ADMIN — SODIUM CHLORIDE 500 ML: 9 INJECTION, SOLUTION INTRAVENOUS at 19:06

## 2023-03-17 RX ADMIN — Medication: at 18:31

## 2023-03-17 ASSESSMENT — LIFESTYLE VARIABLES
HOW OFTEN DO YOU HAVE A DRINK CONTAINING ALCOHOL: NEVER
HOW MANY STANDARD DRINKS CONTAINING ALCOHOL DO YOU HAVE ON A TYPICAL DAY: PATIENT DOES NOT DRINK

## 2023-03-17 NOTE — ED PROVIDER NOTES
Name: Adamaris Johnson    MRN: [id number]     Date / Time Roomed:  3/17/2023  3:52 PM  ED Bed Assignment:  05/05    ------------------ History of Present Illness --------------------  3/17/23, Time: 5:01 PM EDT   Chief Complaint   Patient presents with    Altered Mental Status     Pt's family states she has become more disoriented, states she has pain in her neck and her back. HPI    Adamaris Johnson is a 80 y.o. female, with hx of CHF, stroke, RA, COPD, who presents to the ED today for increased confusion over the past 2 weeks. Patient does live with her daughter, her daughter is currently present. Daughter states that she has seemed to be more confused than her usual, has been urinating more more frequently. Patient has had some back pain over the past few weeks as well, however has been ambulating at her baseline. Patient is confused on questioning, unable to give year however was able to give her birthday and knows her daughter's name. Review of systems felt somewhat unreliable from patient, however she denies any complaints at this time. Daughter states she did have a fall about 4 to 5 weeks ago. The pt denies other ROS at this time. PCP: Abel Mondragon MD.    -------------------- PMH --------------------    Past Medical History:   has a past medical history of Cerebral artery occlusion with cerebral infarction [facility]), CHF (congestive heart failure) ([facility] [facility] COPD (chronic obstructive pulmonary disease) ([facility] [address] [facility] Rheumatoid arthritis ([facility] and Stroke ([facility] Surgical History:  No past surgical history on file. Social History:  Social History     Tobacco Use    Smoking status: Never    Smokeless tobacco: Never   Vaping Use    Vaping Use: Never used   Substance Use Topics    Alcohol use: Never    Drug use: Never       Family History:  No family history on file. Allergies: Other    The patients past medical history has been reviewed.     -------------------- Current Meds --------------------  Discharge Medication List as of 3/17/2023  9:15 PM        CONTINUE these medications which have NOT CHANGED    Details   fluticasone-salmeterol (ADVAIR DISKUS) 250-50 MCG/ACT AEPB diskus inhaler Inhale 1 puff into the lungs every 12 hoursHistorical Med      donepezil (ARICEPT) 10 MG tablet Take 1 tablet by mouth nightly, Disp-30 tablet, R-0Normal      furosemide (LASIX) 40 MG tablet Take 40 mg by mouth 2 times dailyHistorical Med      ipratropium-albuterol (DUONEB) 0.5-2.5 (3) MG/3ML SOLN nebulizer solution USE 1 AMPULE IN NEBULIZER EVERY 6 HOURS AS NEEDED FOR SHORTNESS OF BREATHHistorical Med      Melatonin 10 MG TABS Take by mouthHistorical Med      albuterol sulfate HFA (PROVENTIL;VENTOLIN;PROAIR) 108 (90 Base) MCG/ACT inhaler Inhale 2 puffs into the lungs every 6 hours as needed for Wheezing, Disp-18 g, R-0Normal      metoprolol succinate (TOPROL XL) 50 MG extended release tablet Take 50 mg by mouth in the morning. Historical Med      spironolactone (ALDACTONE) 25 MG tablet Take 25 mg by mouth dailyHistorical Med             The patients home medications have been reviewed. -------------------- PE --------------------  Physical Exam  Constitutional:       General: She is not in acute distress. Appearance: Normal appearance. She is normal weight. She is not ill-appearing, toxic-appearing or diaphoretic. HENT:      Head: Normocephalic and atraumatic. Right Ear: External ear normal.      Left Ear: External ear normal.      Nose: Nose normal. No rhinorrhea. Mouth/Throat:      Pharynx: Oropharynx is clear. Eyes:      General: No scleral icterus. Right eye: No discharge. Left eye: No discharge. Extraocular Movements: Extraocular movements intact. Conjunctiva/sclera: Conjunctivae normal.      Pupils: Pupils are equal, round, and reactive to light. Cardiovascular:      Rate and Rhythm: Normal rate and regular rhythm. Pulses: Normal pulses.    Pulmonary:      Effort: Pulmonary effort is normal. No respiratory distress. Breath sounds: Normal breath sounds. No stridor. Abdominal:      General: Abdomen is flat. There is no distension. Palpations: Abdomen is soft. Tenderness: There is no abdominal tenderness. There is no guarding. Musculoskeletal:         General: No swelling or tenderness. Normal range of motion. Cervical back: Normal range of motion. Right lower leg: No edema. Left lower leg: No edema. Comments: No midline back tenderness. No outward signs of trauma. Patient moving head and extremities without difficulty or apparent pain. Patient was ambulating with her walker. Skin:     General: Skin is warm and dry. Capillary Refill: Capillary refill takes less than 2 seconds. Coloration: Skin is not jaundiced. Findings: No erythema or rash. Neurological:      General: No focal deficit present. Mental Status: She is alert and oriented to person, place, and time. GCS: GCS eye subscore is 4. GCS verbal subscore is 4. GCS motor subscore is 6. Motor: No weakness. Psychiatric:         Mood and Affect: Mood normal. Mood is not anxious. Behavior: Behavior normal. Behavior is not agitated. --------------- External Imaging -------------    -------------------- Procedures --------------------    -------------------- MDM --------------------    /62   Pulse 72   Temp 98 °F (36.7 °C)   Resp 20   Ht 4' 4\" (1.321 m)   Wt 69 lb 12.8 oz (31.7 kg)   LMP  (LMP Unknown)   SpO2 98%   BMI 18.15 kg/m²       Diagnoses considered, but not limited to, include urinary tract infection, metabolic derangement, dehydration, cardiac etiology, intracranial abnormality. Labwork ordered and interpretation by myself. Details below. Imaging ordered and interpretations by myself and radiologist. Details below.     Labs Reviewed   URINALYSIS WITH MICROSCOPIC - Abnormal; Notable for the following components: Result Value    Bacteria, UA FEW (*)     All other components within normal limits   TROPONIN - Abnormal; Notable for the following components:    Troponin, High Sensitivity 40 (*)     All other components within normal limits   CBC WITH AUTO DIFFERENTIAL - Abnormal; Notable for the following components:    .0 (*)     Lymphocytes % 10.9 (*)     Lymphocytes Absolute 0.86 (*)     All other components within normal limits   COMPREHENSIVE METABOLIC PANEL - Abnormal; Notable for the following components:    Glucose 212 (*)     BUN 66 (*)     Creatinine 1.5 (*)     Alkaline Phosphatase 105 (*)     All other components within normal limits   TROPONIN - Abnormal; Notable for the following components:    Troponin, High Sensitivity 34 (*)     All other components within normal limits     As interpreted by me, the above displayed labs are abnormal. All other labs obtained during this visit were within normal range or not returned as of this dictation. ED Course as of 03/18/23 1708   Fri Mar 17, 2023   1834 CT HEAD WO CONTRAST  IMPRESSION:     1. No evidence of acute intracranial process. 2.  Findings of presumed small vessel ischemic deep white matter disease. 3.  Old lacunar infarct within the head of the right caudate nucleus. 4.  Prominence of the sulci and/or CSF spaces suggests a degree of cerebral  atrophy. [PW]   1844 Creatinine(!): 1.5  Prerenal ART, possibly some mild dehydration. [PW]   1845 Electrolytes balanced. Normal liver function. No leukocytosis, no anemia. UA [PW]   1845 UA negative. [PW]   2463 Initial Trope 40, second pending. [PW]   1940 Troponin, High Sensitivity(!): 34  Positive delta trop   [PW]   1950 Spoke with Dr. Segundo Barfield, hospitalist  [PW]   2101 Patient's daughter is wanting to take her back home at this time, does not want her to be admitted, she states they want to follow-up with her own doctors tomorrow and they do not wish to be admitted to the hospital this time. Patient to be leaving AMA, it was recommended that she be admitted here for further care however she is adamantly denying this, risks associated with leaving were discussed and daughter is  understanding. [PW]      ED Course User Index  [PW] Jamshidpaul Trevino DO          Medications given include:  Medications   sodium chloride flush 0.9 % injection (  Given 3/17/23 1831)   0.9 % sodium chloride bolus (0 mLs IntraVENous Stopped 3/17/23 1945)       EKG Interpretation  Interpreted by emergency department physician.     3/17/23  Time: 1741    Rate: 72  Axis: Normal  CO: 196  QRS: 96  Qtc: 457  Rhythm: Regular  Clinical Impression: Normal sinus rhythm, nonspecific T wave abnormalities  Comparison to old EKG: stable as compared to pt's most recent     -------------------- Consults --------------------  IP CONSULT TO INTERNAL MEDICINE    -------------------- RESULTS --------------------    Labs:  Results for orders placed or performed during the hospital encounter of 03/17/23   Urinalysis with Microscopic   Result Value Ref Range    Color, UA Yellow Straw/Yellow    Clarity, UA Clear Clear    Glucose, Ur Negative Negative mg/dL    Bilirubin Urine Negative Negative    Ketones, Urine Negative Negative mg/dL    Specific Gravity, UA 1.015 1.005 - 1.030    Blood, Urine Negative Negative    pH, UA 5.5 5.0 - 9.0    Protein, UA Negative Negative mg/dL    Urobilinogen, Urine 0.2 <2.0 E.U./dL    Nitrite, Urine Negative Negative    Leukocyte Esterase, Urine Negative Negative    Hyaline Casts, UA 0-2 0 - 2 /LPF    WBC, UA 2-5 0 - 5 /HPF    RBC, UA NONE 0 - 2 /HPF    Epithelial Cells, UA RARE /HPF    Bacteria, UA FEW (A) None Seen /HPF   Troponin   Result Value Ref Range    Troponin, High Sensitivity 40 (H) 0 - 9 ng/L   CBC with Auto Differential   Result Value Ref Range    WBC 7.9 4.5 - 11.5 E9/L    RBC 4.10 3.50 - 5.50 E12/L    Hemoglobin 13.6 11.5 - 15.5 g/dL    Hematocrit 41.8 34.0 - 48.0 %    .0 (H) 80.0 - 99.9 fL    MCH 33.2 26.0 - 35.0 pg    MCHC 32.5 32.0 - 34.5 %    RDW 12.7 11.5 - 15.0 fL    Platelets 238 130 - 450 E9/L    MPV 10.2 7.0 - 12.0 fL    Neutrophils % 79.2 43.0 - 80.0 %    Immature Granulocytes % 0.6 0.0 - 5.0 %    Lymphocytes % 10.9 (L) 20.0 - 42.0 %    Monocytes % 7.9 2.0 - 12.0 %    Eosinophils % 1.0 0.0 - 6.0 %    Basophils % 0.4 0.0 - 2.0 %    Neutrophils Absolute 6.25 1.80 - 7.30 E9/L    Immature Granulocytes # 0.05 E9/L    Lymphocytes Absolute 0.86 (L) 1.50 - 4.00 E9/L    Monocytes Absolute 0.62 0.10 - 0.95 E9/L    Eosinophils Absolute 0.08 0.05 - 0.50 E9/L    Basophils Absolute 0.03 0.00 - 0.20 E9/L   CMP   Result Value Ref Range    Sodium 142 132 - 146 mmol/L    Potassium 3.6 3.5 - 5.0 mmol/L    Chloride 105 98 - 107 mmol/L    CO2 25 22 - 29 mmol/L    Anion Gap 12 7 - 16 mmol/L    Glucose 212 (H) 74 - 99 mg/dL    BUN 66 (H) 6 - 23 mg/dL    Creatinine 1.5 (H) 0.5 - 1.0 mg/dL    Est, Glom Filt Rate 33 >=60 mL/min/1.73    Calcium 9.6 8.6 - 10.2 mg/dL    Total Protein 6.5 6.4 - 8.3 g/dL    Albumin 4.1 3.5 - 5.2 g/dL    Total Bilirubin 0.3 0.0 - 1.2 mg/dL    Alkaline Phosphatase 105 (H) 35 - 104 U/L    ALT 17 0 - 32 U/L    AST 23 0 - 31 U/L   Troponin   Result Value Ref Range    Troponin, High Sensitivity 34 (H) 0 - 9 ng/L   EKG 12 Lead   Result Value Ref Range    Ventricular Rate 72 BPM    Atrial Rate 72 BPM    P-R Interval 196 ms    QRS Duration 96 ms    Q-T Interval 418 ms    QTc Calculation (Bazett) 457 ms    P Axis 71 degrees    R Axis -10 degrees    T Axis 78 degrees         Radiology:   Non-plain film images such as CT, Ultrasound and MRI are read by the radiologist. Plain radiographic images are visualized and preliminarily interpreted by the ED Provider in the MDM section.    Interpretation per the Radiologist below, if available at the time of this note:    CT HEAD WO CONTRAST   Final Result      1.  No evidence of acute intracranial process.      2.  Findings of presumed small vessel ischemic deep  white matter disease. 3.  Old lacunar infarct within the head of the right caudate nucleus. 4.  Prominence of the sulci and/or CSF spaces suggests a degree of cerebral   atrophy. XR CHEST PORTABLE   Final Result   No acute process. No results found. No results found.    ------------------- NURSING NOTES & VITALS -------------------    The nursing notes within the ED encounter and vital signs were reviewed. Vitals:    03/17/23 2035   BP: 120/62   Pulse: 72   Resp: 20   Temp:    SpO2: 98%        No data found.        ------------- Final Impression, Disposition & Plan ---------------    Final Impression:   1. Left against medical advice    2. Confusion    3. Dehydration    4. ART (acute kidney injury) ([facility] [address]        Disposition:  [place] 03/17/2023 09:05:11 PM    PATIENT REFERRED TO:  Abel Mondragon MD  [address] [facility]  [phone]    Call in 1 day  For follow up    DISCHARGE MEDICATIONS:  Discharge Medication List as of 3/17/2023  9:15 PM               Please note that portions of this note were completed with a voice recognition program.    Efforts were made to edit the dictations but occasionally words are mis-transcribed.     Latha Yeager DO (electronically signed)       Latha Yeager DO  Resident  03/18/23 1880

## 2023-03-18 LAB
EKG ATRIAL RATE: 72 BPM
EKG P AXIS: 71 DEGREES
EKG P-R INTERVAL: 196 MS
EKG Q-T INTERVAL: 418 MS
EKG QRS DURATION: 96 MS
EKG QTC CALCULATION (BAZETT): 457 MS
EKG R AXIS: -10 DEGREES
EKG T AXIS: 78 DEGREES
EKG VENTRICULAR RATE: 72 BPM

## 2023-03-18 NOTE — ED NOTES
Patients daughter wants to sign out patient AMA. Physician resident aware and in talking with patient.       Doris Jimenez RN  03/17/23 2910

## 2023-03-18 NOTE — ED PROVIDER NOTES
ATTENDING PROVIDER ATTESTATION:     Gareht Cassidy presented to the emergency department for evaluation of Altered Mental Status (Pt's family states she has become more disoriented, states she has pain in her neck and her back. )   and was initially evaluated by the Medical Resident. See Original ED Note for H&P and ED course above. I have reviewed and discussed the case, including pertinent history (medical, surgical, family and social) and exam findings with the Medical Resident assigned to Gareth Cassidy. I have personally performed and/or participated in the history, exam, medical decision making, and procedures and agree with all pertinent clinical information and any additional changes or corrections are noted below in my assessment and plan. I have discussed this patient in detail with the resident, and provided the instruction and education,       I have reviewed my findings and recommendations with the assigned Medical Resident, Gareth Cassidy and members of family present at the time of disposition. My Assessment/Plan: The following summarizes my clinical findings and independent assessment:     History:  Gareth Cassidy is a 80 y.o. female presenting to the ED for confusion, beginning a few days ago. The complaint has been intermittent, moderate in severity, and worsened by nothing. The patient did have a fall earlier today but she slid back down the wall without significant injury. She did not lose conscious. She remote fall several weeks ago where she was unconscious for 3 minutes. There is been no change in medication or diet. There is been no significant cough fever or chills. There has been some urinary frequency.             Review of Systems:   A complete review of systems was performed and pertinent positives and negatives are stated within HPI, all other systems reviewed and are negative.    --------------------------------------------- PAST HISTORY ---------------------------------------------  Past Medical History:  has a past medical history of Cerebral artery occlusion with cerebral infarction ([facility] CHF (congestive heart failure) ([facility] COPD (chronic obstructive pulmonary disease) ([facility] Rheumatoid arthritis ([facility] and Stroke ([facility] Past Surgical History:  has no past surgical history on file. Social History:  reports that she has never smoked. She has never used smokeless tobacco. She reports that she does not drink alcohol and does not use drugs. Family History: family history is not on file. The patients home medications have been reviewed. Allergies: Other  --------------------------------------------------------------------------------------------------------------  Nursing notes and vital signs reviewed          On My Exam:    Constitutional/General: Alert and oriented x3  Head: Normocephalic and atraumatic  Eyes: PERRL, EOMI, conjunctiva normal, sclera non icteric  Mouth: Oropharynx clear, handling secretions, no trismus, no asymmetry of the posterior oropharynx or uvular edema  Neck: Supple, full ROM, no stridor, no meningeal signs  Respiratory: Lungs clear to auscultation bilaterally, no wheezes, rales, or rhonchi. Not in respiratory distress  Cardiovascular:  Regular rate. Regular rhythm. No murmurs, no aortic murmurs, no gallops, or rubs. 2+ distal pulses. Equal extremity pulses. Chest: No chest wall tenderness  GI:  Abdomen Soft, Non tender, Non distended. +BS. No rebound, guarding, or rigidity. No pulsatile masses. Musculoskeletal: Moves all extremities x 4. Warm and well perfused, no clubbing, cyanosis, or edema. Capillary refill <3 seconds  Integument: skin warm and dry. No rashes.    Neurologic: GCS 15, no focal deficits, symmetric strength 5/5 in the upper and lower extremities bilaterally  Psychiatric: Normal Affect        Medical Decision Making:  Patient is soft-spoken and allows her daughter to make decisions for her.  I have explained the daughter in detail and showed her on the computer screen the change from her renal function in September until today. I suggest the patient would benefit admission and IV hydration to ensure her kidney functions improved. The daughter is adamant refusal and will take her mother home. She has promised to give her oral fluids at home and will follow-up with her primary care doctor in the morning. I suggested if she changed her mind and would like her mother to be admitted she was to return at any time. DIAGNOSIS:  1. Left against medical advice    2. Confusion    3. Dehydration    4.  ART (acute kidney injury) ([facility]                         Dominique Hartman MD  03/17/23 3007

## 2023-03-18 NOTE — ED NOTES
Patient in room, with family at bedside requesting to talk with physician regarding leaving and being discharged and not being admitted. Physician aware.      Judy Mancera RN  03/17/23 3920

## 2025-07-02 ENCOUNTER — HOSPITAL ENCOUNTER (INPATIENT)
Age: 89
LOS: 1 days | Discharge: HOSPICE | DRG: 062 | End: 2025-07-03
Attending: EMERGENCY MEDICINE | Admitting: STUDENT IN AN ORGANIZED HEALTH CARE EDUCATION/TRAINING PROGRAM
Payer: MEDICARE

## 2025-07-02 ENCOUNTER — APPOINTMENT (OUTPATIENT)
Dept: GENERAL RADIOLOGY | Age: 89
DRG: 062 | End: 2025-07-02
Payer: MEDICARE

## 2025-07-02 ENCOUNTER — APPOINTMENT (OUTPATIENT)
Dept: CT IMAGING | Age: 89
DRG: 062 | End: 2025-07-02
Payer: MEDICARE

## 2025-07-02 DIAGNOSIS — I63.9 CEREBROVASCULAR ACCIDENT (CVA), UNSPECIFIED MECHANISM (HCC): ICD-10-CM

## 2025-07-02 DIAGNOSIS — I63.9 ACUTE ISCHEMIC STROKE (HCC): Primary | ICD-10-CM

## 2025-07-02 LAB
ABO + RH BLD: NORMAL
ALBUMIN SERPL-MCNC: 3.4 G/DL (ref 3.5–5.2)
ALP SERPL-CCNC: 131 U/L (ref 35–104)
ALT SERPL-CCNC: 11 U/L (ref 0–35)
ANION GAP SERPL CALCULATED.3IONS-SCNC: 11 MMOL/L (ref 7–16)
ARM BAND NUMBER: NORMAL
AST SERPL-CCNC: 23 U/L (ref 0–35)
BASOPHILS # BLD: 0.04 K/UL (ref 0–0.2)
BASOPHILS NFR BLD: 1 % (ref 0–2)
BILIRUB SERPL-MCNC: 0.4 MG/DL (ref 0–1.2)
BLOOD BANK SAMPLE EXPIRATION: NORMAL
BLOOD GROUP ANTIBODIES SERPL: NEGATIVE
BUN SERPL-MCNC: 22 MG/DL (ref 8–23)
CALCIUM SERPL-MCNC: 8.8 MG/DL (ref 8.8–10.2)
CHLORIDE SERPL-SCNC: 107 MMOL/L (ref 98–107)
CHOLEST SERPL-MCNC: 126 MG/DL
CO2 SERPL-SCNC: 24 MMOL/L (ref 22–29)
CREAT SERPL-MCNC: 0.9 MG/DL (ref 0.5–1)
EKG ATRIAL RATE: 94 BPM
EKG P AXIS: 70 DEGREES
EKG P-R INTERVAL: 204 MS
EKG Q-T INTERVAL: 350 MS
EKG QRS DURATION: 110 MS
EKG QTC CALCULATION (BAZETT): 437 MS
EKG R AXIS: -32 DEGREES
EKG T AXIS: 123 DEGREES
EKG VENTRICULAR RATE: 94 BPM
EOSINOPHIL # BLD: 0.27 K/UL (ref 0.05–0.5)
EOSINOPHILS RELATIVE PERCENT: 3 % (ref 0–6)
ERYTHROCYTE [DISTWIDTH] IN BLOOD BY AUTOMATED COUNT: 13.3 % (ref 11.5–15)
GFR, ESTIMATED: 64 ML/MIN/1.73M2
GLUCOSE BLD-MCNC: 163 MG/DL
GLUCOSE BLD-MCNC: 163 MG/DL (ref 74–99)
GLUCOSE BLD-MCNC: 203 MG/DL (ref 74–99)
GLUCOSE SERPL-MCNC: 219 MG/DL (ref 74–99)
HBA1C MFR BLD: 5.6 % (ref 4–5.6)
HCT VFR BLD AUTO: 37.4 % (ref 34–48)
HDLC SERPL-MCNC: 45 MG/DL
HGB BLD-MCNC: 12.3 G/DL (ref 11.5–15.5)
IMM GRANULOCYTES # BLD AUTO: 0.03 K/UL (ref 0–0.58)
IMM GRANULOCYTES NFR BLD: 0 % (ref 0–5)
INR PPP: 1.1
LDLC SERPL CALC-MCNC: 69 MG/DL
LYMPHOCYTES NFR BLD: 2.67 K/UL (ref 1.5–4)
LYMPHOCYTES RELATIVE PERCENT: 33 % (ref 20–42)
MCH RBC QN AUTO: 32.1 PG (ref 26–35)
MCHC RBC AUTO-ENTMCNC: 32.9 G/DL (ref 32–34.5)
MCV RBC AUTO: 97.7 FL (ref 80–99.9)
MONOCYTES NFR BLD: 0.73 K/UL (ref 0.1–0.95)
MONOCYTES NFR BLD: 9 % (ref 2–12)
NEUTROPHILS NFR BLD: 54 % (ref 43–80)
NEUTS SEG NFR BLD: 4.48 K/UL (ref 1.8–7.3)
PARTIAL THROMBOPLASTIN TIME: 29.2 SEC (ref 24.5–35.1)
PLATELET # BLD AUTO: 186 K/UL (ref 130–450)
PMV BLD AUTO: 9.8 FL (ref 7–12)
POTASSIUM SERPL-SCNC: 3.7 MMOL/L (ref 3.5–5.1)
PROT SERPL-MCNC: 5.6 G/DL (ref 6.4–8.3)
PROTHROMBIN TIME: 12.2 SEC (ref 9.3–12.4)
RBC # BLD AUTO: 3.83 M/UL (ref 3.5–5.5)
SODIUM SERPL-SCNC: 142 MMOL/L (ref 136–145)
TRIGL SERPL-MCNC: 59 MG/DL
TROPONIN I SERPL HS-MCNC: 23 NG/L (ref 0–14)
TSH SERPL DL<=0.05 MIU/L-ACNC: 2.01 UIU/ML (ref 0.27–4.2)
VLDLC SERPL CALC-MCNC: 12 MG/DL
WBC OTHER # BLD: 8.2 K/UL (ref 4.5–11.5)

## 2025-07-02 PROCEDURE — 2500000003 HC RX 250 WO HCPCS: Performed by: EMERGENCY MEDICINE

## 2025-07-02 PROCEDURE — 83036 HEMOGLOBIN GLYCOSYLATED A1C: CPT

## 2025-07-02 PROCEDURE — 86850 RBC ANTIBODY SCREEN: CPT

## 2025-07-02 PROCEDURE — 86901 BLOOD TYPING SEROLOGIC RH(D): CPT

## 2025-07-02 PROCEDURE — 2700000000 HC OXYGEN THERAPY PER DAY

## 2025-07-02 PROCEDURE — 93005 ELECTROCARDIOGRAM TRACING: CPT | Performed by: EMERGENCY MEDICINE

## 2025-07-02 PROCEDURE — 70496 CT ANGIOGRAPHY HEAD: CPT

## 2025-07-02 PROCEDURE — 85025 COMPLETE CBC W/AUTO DIFF WBC: CPT

## 2025-07-02 PROCEDURE — 93010 ELECTROCARDIOGRAM REPORT: CPT | Performed by: INTERNAL MEDICINE

## 2025-07-02 PROCEDURE — 3E03317 INTRODUCTION OF OTHER THROMBOLYTIC INTO PERIPHERAL VEIN, PERCUTANEOUS APPROACH: ICD-10-PCS | Performed by: STUDENT IN AN ORGANIZED HEALTH CARE EDUCATION/TRAINING PROGRAM

## 2025-07-02 PROCEDURE — 2500000003 HC RX 250 WO HCPCS: Performed by: STUDENT IN AN ORGANIZED HEALTH CARE EDUCATION/TRAINING PROGRAM

## 2025-07-02 PROCEDURE — 99222 1ST HOSP IP/OBS MODERATE 55: CPT | Performed by: STUDENT IN AN ORGANIZED HEALTH CARE EDUCATION/TRAINING PROGRAM

## 2025-07-02 PROCEDURE — 2000000000 HC ICU R&B

## 2025-07-02 PROCEDURE — 71045 X-RAY EXAM CHEST 1 VIEW: CPT

## 2025-07-02 PROCEDURE — 84443 ASSAY THYROID STIM HORMONE: CPT

## 2025-07-02 PROCEDURE — 80053 COMPREHEN METABOLIC PANEL: CPT

## 2025-07-02 PROCEDURE — 4A03X5D MEASUREMENT OF ARTERIAL FLOW, INTRACRANIAL, EXTERNAL APPROACH: ICD-10-PCS | Performed by: STUDENT IN AN ORGANIZED HEALTH CARE EDUCATION/TRAINING PROGRAM

## 2025-07-02 PROCEDURE — 0042T CT BRAIN PERFUSION: CPT

## 2025-07-02 PROCEDURE — 80061 LIPID PANEL: CPT

## 2025-07-02 PROCEDURE — 6360000002 HC RX W HCPCS: Performed by: STUDENT IN AN ORGANIZED HEALTH CARE EDUCATION/TRAINING PROGRAM

## 2025-07-02 PROCEDURE — 84484 ASSAY OF TROPONIN QUANT: CPT

## 2025-07-02 PROCEDURE — 96374 THER/PROPH/DIAG INJ IV PUSH: CPT

## 2025-07-02 PROCEDURE — 70450 CT HEAD/BRAIN W/O DYE: CPT

## 2025-07-02 PROCEDURE — 99285 EMERGENCY DEPT VISIT HI MDM: CPT

## 2025-07-02 PROCEDURE — 82962 GLUCOSE BLOOD TEST: CPT

## 2025-07-02 PROCEDURE — 94640 AIRWAY INHALATION TREATMENT: CPT

## 2025-07-02 PROCEDURE — 86900 BLOOD TYPING SEROLOGIC ABO: CPT

## 2025-07-02 PROCEDURE — 85610 PROTHROMBIN TIME: CPT

## 2025-07-02 PROCEDURE — 94664 DEMO&/EVAL PT USE INHALER: CPT

## 2025-07-02 PROCEDURE — 85730 THROMBOPLASTIN TIME PARTIAL: CPT

## 2025-07-02 PROCEDURE — 6360000004 HC RX CONTRAST MEDICATION: Performed by: RADIOLOGY

## 2025-07-02 PROCEDURE — 70498 CT ANGIOGRAPHY NECK: CPT

## 2025-07-02 RX ORDER — SODIUM CHLORIDE 0.9 % (FLUSH) 0.9 %
10 SYRINGE (ML) INJECTION
Status: DISCONTINUED | OUTPATIENT
Start: 2025-07-02 | End: 2025-07-04 | Stop reason: HOSPADM

## 2025-07-02 RX ORDER — SODIUM CHLORIDE 0.9 % (FLUSH) 0.9 %
5-40 SYRINGE (ML) INJECTION PRN
Status: DISCONTINUED | OUTPATIENT
Start: 2025-07-02 | End: 2025-07-04 | Stop reason: HOSPADM

## 2025-07-02 RX ORDER — ARFORMOTEROL TARTRATE 15 UG/2ML
15 SOLUTION RESPIRATORY (INHALATION)
Status: DISCONTINUED | OUTPATIENT
Start: 2025-07-02 | End: 2025-07-04 | Stop reason: HOSPADM

## 2025-07-02 RX ORDER — POTASSIUM CHLORIDE 1500 MG/1
40 TABLET, EXTENDED RELEASE ORAL PRN
Status: DISCONTINUED | OUTPATIENT
Start: 2025-07-02 | End: 2025-07-04 | Stop reason: HOSPADM

## 2025-07-02 RX ORDER — IOPAMIDOL 755 MG/ML
105 INJECTION, SOLUTION INTRAVASCULAR
Status: COMPLETED | OUTPATIENT
Start: 2025-07-02 | End: 2025-07-02

## 2025-07-02 RX ORDER — SODIUM CHLORIDE 9 MG/ML
INJECTION, SOLUTION INTRAVENOUS PRN
Status: DISCONTINUED | OUTPATIENT
Start: 2025-07-02 | End: 2025-07-04 | Stop reason: HOSPADM

## 2025-07-02 RX ORDER — BUDESONIDE 0.25 MG/2ML
250 INHALANT ORAL 2 TIMES DAILY
Status: DISCONTINUED | OUTPATIENT
Start: 2025-07-02 | End: 2025-07-04 | Stop reason: HOSPADM

## 2025-07-02 RX ORDER — SODIUM CHLORIDE 0.9 % (FLUSH) 0.9 %
10 SYRINGE (ML) INJECTION ONCE
Status: COMPLETED | OUTPATIENT
Start: 2025-07-02 | End: 2025-07-02

## 2025-07-02 RX ORDER — SODIUM CHLORIDE 0.9 % (FLUSH) 0.9 %
5-40 SYRINGE (ML) INJECTION EVERY 12 HOURS SCHEDULED
Status: DISCONTINUED | OUTPATIENT
Start: 2025-07-02 | End: 2025-07-04 | Stop reason: HOSPADM

## 2025-07-02 RX ORDER — ONDANSETRON 4 MG/1
4 TABLET, ORALLY DISINTEGRATING ORAL EVERY 8 HOURS PRN
Status: DISCONTINUED | OUTPATIENT
Start: 2025-07-02 | End: 2025-07-04 | Stop reason: HOSPADM

## 2025-07-02 RX ORDER — MAGNESIUM SULFATE IN WATER 40 MG/ML
2000 INJECTION, SOLUTION INTRAVENOUS PRN
Status: DISCONTINUED | OUTPATIENT
Start: 2025-07-02 | End: 2025-07-04 | Stop reason: HOSPADM

## 2025-07-02 RX ORDER — POLYETHYLENE GLYCOL 3350 17 G/17G
17 POWDER, FOR SOLUTION ORAL DAILY PRN
Status: DISCONTINUED | OUTPATIENT
Start: 2025-07-02 | End: 2025-07-04 | Stop reason: HOSPADM

## 2025-07-02 RX ORDER — ONDANSETRON 2 MG/ML
4 INJECTION INTRAMUSCULAR; INTRAVENOUS EVERY 6 HOURS PRN
Status: DISCONTINUED | OUTPATIENT
Start: 2025-07-02 | End: 2025-07-04 | Stop reason: HOSPADM

## 2025-07-02 RX ORDER — POTASSIUM CHLORIDE 7.45 MG/ML
10 INJECTION INTRAVENOUS PRN
Status: DISCONTINUED | OUTPATIENT
Start: 2025-07-02 | End: 2025-07-04 | Stop reason: HOSPADM

## 2025-07-02 RX ORDER — SODIUM CHLORIDE 9 MG/ML
INJECTION, SOLUTION INTRAVENOUS PRN
Status: DISCONTINUED | OUTPATIENT
Start: 2025-07-02 | End: 2025-07-03

## 2025-07-02 RX ORDER — SODIUM CHLORIDE 0.9 % (FLUSH) 0.9 %
5-40 SYRINGE (ML) INJECTION PRN
Status: DISCONTINUED | OUTPATIENT
Start: 2025-07-02 | End: 2025-07-03

## 2025-07-02 RX ORDER — ACETAMINOPHEN 325 MG/1
650 TABLET ORAL EVERY 6 HOURS PRN
Status: DISCONTINUED | OUTPATIENT
Start: 2025-07-02 | End: 2025-07-04 | Stop reason: HOSPADM

## 2025-07-02 RX ORDER — ACETAMINOPHEN 650 MG/1
650 SUPPOSITORY RECTAL EVERY 6 HOURS PRN
Status: DISCONTINUED | OUTPATIENT
Start: 2025-07-02 | End: 2025-07-04 | Stop reason: HOSPADM

## 2025-07-02 RX ADMIN — SODIUM CHLORIDE, PRESERVATIVE FREE 10 ML: 5 INJECTION INTRAVENOUS at 10:21

## 2025-07-02 RX ADMIN — ARFORMOTEROL TARTRATE 15 MCG: 15 SOLUTION RESPIRATORY (INHALATION) at 12:55

## 2025-07-02 RX ADMIN — ARFORMOTEROL TARTRATE 15 MCG: 15 SOLUTION RESPIRATORY (INHALATION) at 19:37

## 2025-07-02 RX ADMIN — IOPAMIDOL 105 ML: 755 INJECTION, SOLUTION INTRAVENOUS at 10:03

## 2025-07-02 RX ADMIN — SODIUM CHLORIDE, PRESERVATIVE FREE 10 ML: 5 INJECTION INTRAVENOUS at 11:34

## 2025-07-02 RX ADMIN — BUDESONIDE 250 MCG: 0.25 SUSPENSION RESPIRATORY (INHALATION) at 19:37

## 2025-07-02 RX ADMIN — SODIUM CHLORIDE, PRESERVATIVE FREE 10 ML: 5 INJECTION INTRAVENOUS at 21:59

## 2025-07-02 RX ADMIN — BUDESONIDE 250 MCG: 0.25 SUSPENSION RESPIRATORY (INHALATION) at 12:55

## 2025-07-02 RX ADMIN — SODIUM CHLORIDE, PRESERVATIVE FREE 10 ML: 5 INJECTION INTRAVENOUS at 21:58

## 2025-07-02 ASSESSMENT — LIFESTYLE VARIABLES
HOW MANY STANDARD DRINKS CONTAINING ALCOHOL DO YOU HAVE ON A TYPICAL DAY: PATIENT DOES NOT DRINK
HOW OFTEN DO YOU HAVE A DRINK CONTAINING ALCOHOL: NEVER

## 2025-07-02 NOTE — FLOWSHEET NOTE
07/02/25 1640   Belongings   Dental Appliances None   Vision - Corrective Lenses None   Hearing Aid None   Clothing At home   Jewelry None   Electronic Devices None   Weapons (Notify Protective Services/Security) None   Home Medications None     4 Eyes Skin Assessment     NAME:  Loreta Santana  YOB: 1935  MEDICAL RECORD NUMBER:  [id number]    The patient is being assessed for  Admission    I agree that at least one RN has performed a thorough Head to Toe Skin Assessment on the patient. ALL assessment sites listed below have been assessed.      Areas assessed by both nurses:    Head, Face, Ears, Shoulders, Back, Chest, Arms, Elbows, Hands, Sacrum. Buttock, Coccyx, Ischium, Legs. Feet and Heels, and Under Medical Devices   Bruising generalized. Redness bilat buttocks blanchable intact        Does the Patient have a Wound? No noted wound(s)       Tru Prevention initiated by RN: Yes  Wound Care Orders initiated by RN: No    Pressure Injury (Stage 1,2,3,4, Unstageable, DTI, NWPT, and Complex wounds) if present, place Wound referral order by RN under : No    New Ostomies, if present place, Ostomy referral order under : No     Nurse 1 eSignature: Electronically signed by Cindy Solano RN on 7/2/25 at 5:14 PM EDT    **SHARE this note so that the co-signing nurse can place an eSignature**    Nurse 2 eSignature: Electronically signed by Sage Giles RN on 7/2/25 at 4:41 PM EDT

## 2025-07-02 NOTE — ED PROVIDER NOTES
falls  Limb Ataxia (7): (!) Present in two limbs  Sensory (8): (!) Severe to total loss  Best Language (9): Severe aphasia  Dysarthria (10): Severe, unintelligible slurring or mute  Extinction and Inattention (11): (!) Profound adan-inattention or extinction to more than one modality  Total: 29    Alto Coma Scale  Eye Opening: Spontaneous  Best Verbal Response: Incomprehensible speech  Best Motor Response: Obeys commands  Alto Coma Scale Score: 12                CIWA Assessment  BP: (!) 127/53  Pulse: 86           PHYSICAL EXAM:     ED Triage Vitals   BP Systolic BP Percentile Diastolic BP Percentile Temp Temp src Pulse Resp SpO2   -- -- -- -- -- -- -- --      Height Weight         -- --                   Constitutional/General: Requires attention to maintain alertness  Head: Normocephalic and atraumatic  Eyes: PERRL, EOMI, sclera non icteric  Mouth:  Oropharynx clear, handling secretions  Neck: Supple, full ROM, no stridor, no meningeal signs  Respiratory: Lungs clear to auscultation bilaterally, no wheezes, rales, or rhonchi. Not in respiratory distress  Cardiovascular:  Regular rate. Regular rhythm. No murmurs, no gallops, no rubs. Equal extremity pulses.   GI:  Abdomen Soft, Non tender, Non distended.  No rebound, guarding, or rigidity. No pulsatile masses.  Musculoskeletal: Warm and well perfused, no clubbing, no cyanosis, no edema. Capillary refill <3 seconds. Compartments are soft and compressible. 2+ distal pulses.  No calf tenderness or palpable cords.  Integument: skin warm and dry. No rashes.   Neurologic: Eyes open, closes eyes and squeezes hands to command, able to maintain her right arm against gravity, all other limbs fall to gravity, she is able to squeeze her hand on the right, she has weak hand grasp on the left, she has left lower facial paralysis, she appears to have left-sided hemianopsia and left-sided neglect, she is completely aphasic and any sounds are done comprehensible garbled

## 2025-07-02 NOTE — ED NOTES
Stroke/ Re Alert Time:0950      Time Neurologist called:0952  natalie  :    Time CT Notified: 0950      BRAIN alert time: (If applicable)

## 2025-07-02 NOTE — PLAN OF CARE
Problem: Safety - Adult  Goal: Free from fall injury  7/2/2025 1950 by Georgia Goldsmith, RN  Outcome: Progressing  Flowsheets (Taken 7/2/2025 1950)  Free From Fall Injury:   Instruct family/caregiver on patient safety   Based on caregiver fall risk screen, instruct family/caregiver to ask for assistance with transferring infant if caregiver noted to have fall risk factors     Problem: Chronic Conditions and Co-morbidities  Goal: Patient's chronic conditions and co-morbidity symptoms are monitored and maintained or improved  7/2/2025 1950 by Georgia Goldsmith, RN  Outcome: Progressing  Flowsheets (Taken 7/2/2025 1950)  Care Plan - Patient's Chronic Conditions and Co-Morbidity Symptoms are Monitored and Maintained or Improved:   Monitor and assess patient's chronic conditions and comorbid symptoms for stability, deterioration, or improvement   Collaborate with multidisciplinary team to address chronic and comorbid conditions and prevent exacerbation or deterioration   Update acute care plan with appropriate goals if chronic or comorbid symptoms are exacerbated and prevent overall improvement and discharge     Problem: Discharge Planning  Goal: Discharge to home or other facility with appropriate resources  7/2/2025 1950 by Georgia Goldsmith, RN  Outcome: Progressing  Flowsheets (Taken 7/2/2025 1950)  Discharge to home or other facility with appropriate resources:   Identify barriers to discharge with patient and caregiver   Arrange for needed discharge resources and transportation as appropriate   Identify discharge learning needs (meds, wound care, etc)     Problem: Neurosensory - Adult  Goal: Achieves stable or improved neurological status  7/2/2025 1950 by Georgia Goldsmith, RN  Outcome: Progressing  Flowsheets (Taken 7/2/2025 1950)  Achieves stable or improved neurological status:   Assess for and report changes in neurological status   Maintain blood pressure and fluid volume within ordered parameters to

## 2025-07-02 NOTE — PLAN OF CARE
Problem: Safety - Adult  Goal: Free from fall injury  7/2/2025 1752 by Cindy Solano RN  Outcome: Progressing  7/2/2025 1634 by Sage Giles RN  Outcome: Progressing  Flowsheets (Taken 7/2/2025 1545 by Cindy Solano RN)  Free From Fall Injury: Instruct family/caregiver on patient safety     Problem: Chronic Conditions and Co-morbidities  Goal: Patient's chronic conditions and co-morbidity symptoms are monitored and maintained or improved  7/2/2025 1752 by Cindy Solano RN  Outcome: Progressing  7/2/2025 1634 by Sage Giles RN  Outcome: Progressing  Flowsheets (Taken 7/2/2025 1545 by Cindy Solano RN)  Care Plan - Patient's Chronic Conditions and Co-Morbidity Symptoms are Monitored and Maintained or Improved: Monitor and assess patient's chronic conditions and comorbid symptoms for stability, deterioration, or improvement     Problem: Discharge Planning  Goal: Discharge to home or other facility with appropriate resources  7/2/2025 1752 by Cindy Solano RN  Outcome: Progressing  7/2/2025 1634 by Sage Giles RN  Outcome: Progressing  Flowsheets (Taken 7/2/2025 1545 by Cindy Solano RN)  Discharge to home or other facility with appropriate resources: Identify barriers to discharge with patient and caregiver     Problem: Neurosensory - Adult  Goal: Achieves stable or improved neurological status  7/2/2025 1752 by Cindy Solano RN  Outcome: Progressing  7/2/2025 1634 by Sage Giles RN  Outcome: Progressing  Flowsheets (Taken 7/2/2025 1545 by Cindy Solano RN)  Achieves stable or improved neurological status:   Assess for and report changes in neurological status   Initiate measures to prevent increased intracranial pressure   Maintain blood pressure and fluid volume within ordered parameters to optimize cerebral perfusion and minimize risk of hemorrhage   Monitor temperature, glucose, and sodium. Initiate appropriate interventions as ordered  Goal: Absence of seizures  7/2/2025 1752 by Cindy Solano

## 2025-07-02 NOTE — H&P
Negative 03/17/2023 05:07 PM    GLUCOSEU Negative 03/17/2023 05:07 PM       Imaging:  CT HEAD WO CONTRAST  Addendum Date: 7/2/2025  ADDENDUM: Results given to Dr. Perez at 10:19 a.m. 07/02/2025     Result Date: 7/2/2025  EXAMINATION: CT OF THE HEAD WITHOUT CONTRAST  7/2/2025 9:55 am TECHNIQUE: CT of the head was performed without the administration of intravenous contrast. Automated exposure control, iterative reconstruction, and/or weight based adjustment of the mA/kV was utilized to reduce the radiation dose to as low as reasonably achievable. COMPARISON: None. HISTORY: ORDERING SYSTEM PROVIDED HISTORY: CVA TECHNOLOGIST PROVIDED HISTORY: Has a \"code stroke\" or \"stroke alert\" been called?->Yes Reason for exam:->CVA Decision Support Exception - unselect if not a suspected or confirmed emergency medical condition->Emergency Medical Condition (MA) What reading provider will be dictating this exam?->CRC FINDINGS: BRAIN/VENTRICLES: Generalized atrophy identified within the brain.  Some low-attenuation areas seen in the periventricular and subcortical white matter to suggest chronic small vessel ischemic change..  There is no acute intracranial hemorrhage, mass effect or midline shift.  No abnormal extra-axial fluid collection.  The gray-white differentiation is maintained without evidence of an acute infarct.  There is no evidence of hydrocephalus. ORBITS: The visualized portion of the orbits demonstrate no acute abnormality. SINUSES: The visualized paranasal sinuses and mastoid air cells demonstrate no acute abnormality. SOFT TISSUES/SKULL:  No acute abnormality of the visualized skull or soft tissues.     Atrophy and chronic changes seen within the brain with no acute intracranial abnormality.     XR CHEST PORTABLE  Result Date: 7/2/2025  EXAMINATION: ONE XRAY VIEW OF THE CHEST PORTABLE 7/2/2025 10:27 am COMPARISON: 03/17/2023 HISTORY: ORDERING SYSTEM PROVIDED HISTORY: weakness, Possible Stroke TECHNOLOGIST PROVIDED

## 2025-07-02 NOTE — ED NOTES
Pt is resting comfortably with eyes closed, pt's family states this is usually when she \"naps\", pt is able to be aroused with painful stimuli, unable to squeeze nurses hands or lift arms,was able to wiggle toes, unable to articulate time, date and self. Left eye is sluggish while right eye is brisk and pt responded to the light in her right eye by squinting but did not in her left eye

## 2025-07-03 ENCOUNTER — APPOINTMENT (OUTPATIENT)
Age: 89
DRG: 062 | End: 2025-07-03
Attending: STUDENT IN AN ORGANIZED HEALTH CARE EDUCATION/TRAINING PROGRAM
Payer: MEDICARE

## 2025-07-03 ENCOUNTER — APPOINTMENT (OUTPATIENT)
Dept: CT IMAGING | Age: 89
DRG: 062 | End: 2025-07-03
Attending: PSYCHIATRY & NEUROLOGY
Payer: MEDICARE

## 2025-07-03 VITALS
RESPIRATION RATE: 17 BRPM | WEIGHT: 82.8 LBS | HEART RATE: 78 BPM | TEMPERATURE: 98.9 F | HEIGHT: 55 IN | BODY MASS INDEX: 19.16 KG/M2 | DIASTOLIC BLOOD PRESSURE: 70 MMHG | SYSTOLIC BLOOD PRESSURE: 139 MMHG | OXYGEN SATURATION: 92 %

## 2025-07-03 PROBLEM — Z71.89 GOALS OF CARE, COUNSELING/DISCUSSION: Status: ACTIVE | Noted: 2025-07-03

## 2025-07-03 PROBLEM — Z51.5 PALLIATIVE CARE ENCOUNTER: Status: ACTIVE | Noted: 2025-07-03

## 2025-07-03 PROBLEM — R13.12 OROPHARYNGEAL DYSPHAGIA: Status: ACTIVE | Noted: 2025-07-03

## 2025-07-03 PROBLEM — R47.1 DYSARTHRIA: Status: ACTIVE | Noted: 2025-07-03

## 2025-07-03 LAB
ANION GAP SERPL CALCULATED.3IONS-SCNC: 11 MMOL/L (ref 7–16)
BASOPHILS # BLD: 0.02 K/UL (ref 0–0.2)
BASOPHILS NFR BLD: 0 % (ref 0–2)
BUN SERPL-MCNC: 19 MG/DL (ref 8–23)
CALCIUM SERPL-MCNC: 9.7 MG/DL (ref 8.8–10.2)
CHLORIDE SERPL-SCNC: 103 MMOL/L (ref 98–107)
CO2 SERPL-SCNC: 25 MMOL/L (ref 22–29)
CREAT SERPL-MCNC: 0.8 MG/DL (ref 0.5–1)
ECHO AO ASC DIAM: 4.2 CM
ECHO AO ASCENDING AORTA INDEX: 3.68 CM/M2
ECHO AR MAX VEL PISA: 4.1 M/S
ECHO AV AREA PEAK VELOCITY: 1.7 CM2
ECHO AV AREA VTI: 2 CM2
ECHO AV AREA/BSA PEAK VELOCITY: 1.5 CM2/M2
ECHO AV AREA/BSA VTI: 1.8 CM2/M2
ECHO AV MEAN GRADIENT: 7 MMHG
ECHO AV MEAN VELOCITY: 1.2 M/S
ECHO AV PEAK GRADIENT: 13 MMHG
ECHO AV PEAK VELOCITY: 1.8 M/S
ECHO AV REGURGITANT PHT: 499.9 MS
ECHO AV VELOCITY RATIO: 0.56
ECHO AV VTI: 35.4 CM
ECHO BSA: 1.16 M2
ECHO EST RA PRESSURE: 3 MMHG
ECHO LA DIAMETER INDEX: 3.25 CM/M2
ECHO LA DIAMETER: 3.7 CM
ECHO LA VOL A-L A2C: 48 ML (ref 22–52)
ECHO LA VOL A-L A4C: 74 ML (ref 22–52)
ECHO LA VOL BP: 55 ML (ref 22–52)
ECHO LA VOL MOD A2C: 45 ML (ref 22–52)
ECHO LA VOL MOD A4C: 67 ML (ref 22–52)
ECHO LA VOL/BSA BIPLANE: 48 ML/M2 (ref 16–34)
ECHO LA VOLUME AREA LENGTH: 60 ML
ECHO LA VOLUME INDEX A-L A2C: 42 ML/M2 (ref 16–34)
ECHO LA VOLUME INDEX A-L A4C: 65 ML/M2 (ref 16–34)
ECHO LA VOLUME INDEX AREA LENGTH: 53 ML/M2 (ref 16–34)
ECHO LA VOLUME INDEX MOD A2C: 39 ML/M2 (ref 16–34)
ECHO LA VOLUME INDEX MOD A4C: 59 ML/M2 (ref 16–34)
ECHO LV EF PHYSICIAN: 60 %
ECHO LV FRACTIONAL SHORTENING: 35 % (ref 28–44)
ECHO LV INTERNAL DIMENSION DIASTOLE INDEX: 2.98 CM/M2
ECHO LV INTERNAL DIMENSION DIASTOLIC: 3.4 CM (ref 3.9–5.3)
ECHO LV INTERNAL DIMENSION SYSTOLIC INDEX: 1.93 CM/M2
ECHO LV INTERNAL DIMENSION SYSTOLIC: 2.2 CM
ECHO LV ISOVOLUMETRIC RELAXATION TIME (IVRT): 99 MS
ECHO LV IVSD: 1.7 CM (ref 0.6–0.9)
ECHO LV IVSS: 1.5 CM
ECHO LV POSTERIOR WALL SYSTOLIC: 1.4 CM
ECHO LVOT AREA: 3.1 CM2
ECHO LVOT AV VTI INDEX: 0.64
ECHO LVOT DIAM: 2 CM
ECHO LVOT MEAN GRADIENT: 2 MMHG
ECHO LVOT PEAK GRADIENT: 4 MMHG
ECHO LVOT PEAK VELOCITY: 1 M/S
ECHO LVOT STROKE VOLUME INDEX: 62.5 ML/M2
ECHO LVOT SV: 71.3 ML
ECHO LVOT VTI: 22.7 CM
ECHO MV "A" WAVE DURATION: 171.3 MSEC
ECHO MV A VELOCITY: 0.66 M/S
ECHO MV AREA PHT: 2.9 CM2
ECHO MV AREA VTI: 2.5 CM2
ECHO MV E DECELERATION TIME (DT): 152.4 MS
ECHO MV E VELOCITY: 0.96 M/S
ECHO MV E/A RATIO: 1.45
ECHO MV LVOT VTI INDEX: 1.27
ECHO MV MAX VELOCITY: 1.4 M/S
ECHO MV MEAN GRADIENT: 3 MMHG
ECHO MV MEAN VELOCITY: 0.8 M/S
ECHO MV PEAK GRADIENT: 8 MMHG
ECHO MV PRESSURE HALF TIME (PHT): 75.8 MS
ECHO MV REGURGITANT VELOCITY PISA: 5.4 M/S
ECHO MV REGURGITANT VTIA: 200.6 CM
ECHO MV VTI: 28.9 CM
ECHO PV MAX VELOCITY: 1.1 M/S
ECHO PV MEAN GRADIENT: 3 MMHG
ECHO PV MEAN VELOCITY: 0.8 M/S
ECHO PV PEAK GRADIENT: 5 MMHG
ECHO PV VTI: 15.4 CM
ECHO RIGHT VENTRICULAR SYSTOLIC PRESSURE (RVSP): 61 MMHG
ECHO RV INTERNAL DIMENSION: 3.6 CM
ECHO TV REGURGITANT MAX VELOCITY: 3.8 M/S
ECHO TV REGURGITANT PEAK GRADIENT: 58 MMHG
EOSINOPHIL # BLD: 0.09 K/UL (ref 0.05–0.5)
EOSINOPHILS RELATIVE PERCENT: 1 % (ref 0–6)
ERYTHROCYTE [DISTWIDTH] IN BLOOD BY AUTOMATED COUNT: 13.1 % (ref 11.5–15)
GFR, ESTIMATED: 71 ML/MIN/1.73M2
GLUCOSE SERPL-MCNC: 113 MG/DL (ref 74–99)
HCT VFR BLD AUTO: 38.8 % (ref 34–48)
HGB BLD-MCNC: 13.1 G/DL (ref 11.5–15.5)
IMM GRANULOCYTES # BLD AUTO: <0.03 K/UL (ref 0–0.58)
IMM GRANULOCYTES NFR BLD: 0 % (ref 0–5)
LYMPHOCYTES NFR BLD: 1.07 K/UL (ref 1.5–4)
LYMPHOCYTES RELATIVE PERCENT: 9 % (ref 20–42)
MCH RBC QN AUTO: 32.8 PG (ref 26–35)
MCHC RBC AUTO-ENTMCNC: 33.8 G/DL (ref 32–34.5)
MCV RBC AUTO: 97 FL (ref 80–99.9)
MONOCYTES NFR BLD: 1.11 K/UL (ref 0.1–0.95)
MONOCYTES NFR BLD: 9 % (ref 2–12)
NEUTROPHILS NFR BLD: 81 % (ref 43–80)
NEUTS SEG NFR BLD: 9.71 K/UL (ref 1.8–7.3)
PLATELET # BLD AUTO: 181 K/UL (ref 130–450)
PMV BLD AUTO: 9.6 FL (ref 7–12)
POTASSIUM SERPL-SCNC: 4.3 MMOL/L (ref 3.5–5.1)
RBC # BLD AUTO: 4 M/UL (ref 3.5–5.5)
SODIUM SERPL-SCNC: 139 MMOL/L (ref 136–145)
WBC OTHER # BLD: 12 K/UL (ref 4.5–11.5)

## 2025-07-03 PROCEDURE — 92610 EVALUATE SWALLOWING FUNCTION: CPT

## 2025-07-03 PROCEDURE — 85025 COMPLETE CBC W/AUTO DIFF WBC: CPT

## 2025-07-03 PROCEDURE — 93306 TTE W/DOPPLER COMPLETE: CPT | Performed by: INTERNAL MEDICINE

## 2025-07-03 PROCEDURE — 94640 AIRWAY INHALATION TREATMENT: CPT

## 2025-07-03 PROCEDURE — 97166 OT EVAL MOD COMPLEX 45 MIN: CPT

## 2025-07-03 PROCEDURE — 70450 CT HEAD/BRAIN W/O DYE: CPT

## 2025-07-03 PROCEDURE — 36415 COLL VENOUS BLD VENIPUNCTURE: CPT

## 2025-07-03 PROCEDURE — 80048 BASIC METABOLIC PNL TOTAL CA: CPT

## 2025-07-03 PROCEDURE — 93306 TTE W/DOPPLER COMPLETE: CPT

## 2025-07-03 PROCEDURE — 92523 SPEECH SOUND LANG COMPREHEN: CPT

## 2025-07-03 PROCEDURE — 2500000003 HC RX 250 WO HCPCS: Performed by: SURGERY

## 2025-07-03 PROCEDURE — 2700000000 HC OXYGEN THERAPY PER DAY

## 2025-07-03 PROCEDURE — 2500000003 HC RX 250 WO HCPCS: Performed by: STUDENT IN AN ORGANIZED HEALTH CARE EDUCATION/TRAINING PROGRAM

## 2025-07-03 PROCEDURE — 97530 THERAPEUTIC ACTIVITIES: CPT

## 2025-07-03 PROCEDURE — 92526 ORAL FUNCTION THERAPY: CPT

## 2025-07-03 PROCEDURE — 97163 PT EVAL HIGH COMPLEX 45 MIN: CPT

## 2025-07-03 PROCEDURE — 6360000002 HC RX W HCPCS: Performed by: STUDENT IN AN ORGANIZED HEALTH CARE EDUCATION/TRAINING PROGRAM

## 2025-07-03 PROCEDURE — 2580000003 HC RX 258: Performed by: SURGERY

## 2025-07-03 RX ORDER — LABETALOL HYDROCHLORIDE 5 MG/ML
5 INJECTION, SOLUTION INTRAVENOUS EVERY 4 HOURS PRN
Status: DISCONTINUED | OUTPATIENT
Start: 2025-07-03 | End: 2025-07-04 | Stop reason: HOSPADM

## 2025-07-03 RX ORDER — BISACODYL 10 MG
10 SUPPOSITORY, RECTAL RECTAL DAILY PRN
Status: DISCONTINUED | OUTPATIENT
Start: 2025-07-03 | End: 2025-07-04 | Stop reason: HOSPADM

## 2025-07-03 RX ORDER — SODIUM CHLORIDE 9 MG/ML
INJECTION, SOLUTION INTRAVENOUS CONTINUOUS
Status: DISCONTINUED | OUTPATIENT
Start: 2025-07-03 | End: 2025-07-03

## 2025-07-03 RX ORDER — METOPROLOL TARTRATE 1 MG/ML
2.5 INJECTION, SOLUTION INTRAVENOUS EVERY 6 HOURS
Status: DISCONTINUED | OUTPATIENT
Start: 2025-07-03 | End: 2025-07-04 | Stop reason: HOSPADM

## 2025-07-03 RX ORDER — ATORVASTATIN CALCIUM 40 MG/1
40 TABLET, FILM COATED ORAL NIGHTLY
Status: DISCONTINUED | OUTPATIENT
Start: 2025-07-03 | End: 2025-07-04 | Stop reason: HOSPADM

## 2025-07-03 RX ORDER — HYDRALAZINE HYDROCHLORIDE 20 MG/ML
5 INJECTION INTRAMUSCULAR; INTRAVENOUS EVERY 4 HOURS PRN
Status: DISCONTINUED | OUTPATIENT
Start: 2025-07-03 | End: 2025-07-04 | Stop reason: HOSPADM

## 2025-07-03 RX ORDER — DEXTROSE MONOHYDRATE AND SODIUM CHLORIDE 5; .9 G/100ML; G/100ML
INJECTION, SOLUTION INTRAVENOUS CONTINUOUS
Status: DISCONTINUED | OUTPATIENT
Start: 2025-07-03 | End: 2025-07-04 | Stop reason: HOSPADM

## 2025-07-03 RX ADMIN — BUDESONIDE 250 MCG: 0.25 SUSPENSION RESPIRATORY (INHALATION) at 07:35

## 2025-07-03 RX ADMIN — METOROPROLOL TARTRATE 2.5 MG: 5 INJECTION, SOLUTION INTRAVENOUS at 16:03

## 2025-07-03 RX ADMIN — ARFORMOTEROL TARTRATE 15 MCG: 15 SOLUTION RESPIRATORY (INHALATION) at 07:35

## 2025-07-03 RX ADMIN — SODIUM CHLORIDE: 0.9 INJECTION, SOLUTION INTRAVENOUS at 09:51

## 2025-07-03 RX ADMIN — SODIUM CHLORIDE, PRESERVATIVE FREE 10 ML: 5 INJECTION INTRAVENOUS at 10:07

## 2025-07-03 RX ADMIN — METOROPROLOL TARTRATE 2.5 MG: 5 INJECTION, SOLUTION INTRAVENOUS at 10:07

## 2025-07-03 NOTE — CONSULTS
Palliative Care Department  [phone]  Palliative Care Initial Consult  Provider Vanita Lora PA-C     Loreta Santana  [id number]  Hospital Day: 2  Date of Initial Consult: 7/3/25  Referring Provider: Macey Murphy APRN - CNS   Palliative Medicine was consulted for assistance with:   new stroke. hx of previsou strokes. with dysarthria/dysphagia.       HPI:   Loreta Santana is a 89 y.o. with a medical history of previous CVA, CHF, COPD who was admitted on 7/2/2025 from home with a CHIEF COMPLAINT of strokelike symptoms.  Lives at home with her daughter.  Last known well at 8:45 AM on the day of admission.  Then developed difficulty talking, altered mental status, inability to move left side.  Stroke alert called in the ED.  CTA head and neck completed showing no intracranial hemorrhage, atrophy and chronic changes seen within the brain with no acute intracranial abnormality, no perfusion mismatch, moderate stenosis in the M1 portion of the left medial cerebral artery, and no abnormalities in the neck.  TNK administered at 10:21 AM with improvement in weakness.  Patient admitted to neuro ICU for further care.  Failed speech eval.  Plan for [place] placement.  Palliative care consulted for goals of care.    ASSESSMENT/PLAN:     Pertinent Hospital Diagnoses     CVA  AMS/dysarthria    Palliative Care Encounter / Counseling Regarding Goals of Care  Please see detailed goals of care discussion as below  At this time, Loreta Santana, Does Not have capacity for medical decision-making.  Capacity is time limited and situation/question specific  During encounter Joyce Santana was surrogate medical decision-maker  Outcome of goals of care meeting:   Confirmed full code  Wants speech evaluation repeated with warm liquid not ice cube or applesauce  Goal is to return home with current home care and aide services  Continue current level of care  Code status Full Code  Advanced Directives: no POA or living will in

## 2025-07-03 NOTE — PROCEDURES
PROCEDURE NOTE  Date: 7/3/2025   Name: Loreta Santana  YOB: 1935    Procedures    Attempted echo, patient is going to another test. RN asked that I come back later.

## 2025-07-03 NOTE — CONSULTS
Neuro Science Intensive Care Unit  Critical Care  Critical Care Consult Note  7/3/2025      Date of Admission: 07/02/2025    CC: Follow up for critical care management of stroke.      HOSPITAL COURSE/OVERNIGHT EVENTS:  89 yo woman presented to the ED with slurred speech & left sided weakness.  LKW 0845.  NIH SS 29. Hx of previous stroke. Imaging did not reveal any intracranial or LVO.  Decision made to administer TNK at 10:30.  Admitted to Neuro ICU.  Lethargic intermittently. Not awake to complete bedside swallow.     7/3 Awake this am.  Dysarthric.  Speech here.  Did not pass swallow.  For HCT this am.      PMH:   Past Medical History:   Diagnosis Date    Cerebral artery occlusion with cerebral infarction (HCC)     CHF (congestive heart failure) ([facility])     COPD (chronic obstructive pulmonary disease) ([facility])     Rheumatoid arthritis ([facility])     Stroke ([facility])      PSH: No past surgical history on file.    Home Medications:   Prior to Admission medications    Medication Sig Start Date End Date Taking? Authorizing Provider   fluticasone-salmeterol (ADVAIR DISKUS) 250-50 MCG/ACT AEPB diskus inhaler Inhale 1 puff into the lungs 2 times daily as needed    ProviderReal MD   donepezil (ARICEPT) 10 MG tablet Take 1 tablet by mouth nightly  Patient not taking: Reported on 7/2/2025 9/29/22   Shahid Verdugo MD   furosemide (LASIX) 40 MG tablet Take 1 tablet by mouth daily    ProviderReal MD   ipratropium-albuterol (DUONEB) 0.5-2.5 (3) MG/3ML SOLN nebulizer solution Take 3 mLs by nebulization every 6 hours as needed for Shortness of Breath or Wheezing    ProviderReal MD   Melatonin 10 MG TABS Take by mouth  Patient not taking: Reported on 7/2/2025    ProviderReal MD   albuterol sulfate HFA (PROVENTIL;VENTOLIN;PROAIR) 108 (90 Base) MCG/ACT inhaler Inhale 2 puffs into the lungs every 6 hours as needed for Wheezing 8/12/22   Edgar Mercado DO   metoprolol succinate (TOPROL XL) 50 MG extended

## 2025-07-03 NOTE — CARE COORDINATION
Transition of Care: Met with Joyce (daughter) at bedside and explained case management role. Patient confused. Patient lives with family in a one level home. House is handicap accessible with grab bars, walk in shower. Patient uses rollator at baseline. Has aid services through [facility] 32 hrs/week. Hx [facility]. No Hx SNF or DME. Room air baseline. Currently on 2 Liters Nasal Cannula. Patient primary care physician is Chuck Kwong MD. Patient uses [facility]. Primary decision maker is Joyce (daughter). Discharge plan is Home with [facility]. Joyce adamantly refuses SNF. Joyce requested [facility]. Referral made through [facility]. Patient came to hospital with Stroke like symptoms. Received TNK. Needs MRI. Currently in Neuro ICU. CM/SW to follow. MT    Case Management Assessment  Initial Evaluation    Date/Time of Evaluation: 7/3/2025 12:31 PM  Assessment Completed by: Xavier Chin RN    If patient is discharged prior to next notation, then this note serves as note for discharge by case management.    Patient Name: Loreta Santana                   YOB: 1935  Diagnosis: Acute ischemic stroke (HCC) [I63.9]  Stroke determined by clinical assessment (HCC) [I63.9]  Cerebrovascular accident (CVA), unspecified mechanism (HCC) [I63.9]                   Date / Time: 7/2/2025  9:51 AM    Patient Admission Status: Inpatient   Readmission Risk (Low < 19, Mod (19-27), High > 27): Readmission Risk Score: 13.4    Current PCP: Chuck Kwong MD  PCP verified by ? Yes    Chart Reviewed: Yes      History Provided by: Patient  Patient Orientation: Alert and Oriented    Patient Cognition: Alert    Hospitalization in the last 30 days (Readmission):  No    If yes, Readmission Assessment in  Navigator will be completed.    Advance Directives:      Code Status: Full Code   Patient's Primary Decision Maker is: Legal Next of Kin    Primary Decision Maker: JOYCE SANTANA - Child - [phone]    Discharge

## 2025-07-03 NOTE — PLAN OF CARE
6:45 pm  Patient daughter states that  she spoke with patient's cardiologist and after long discussion has decided to change code status to comfort care  I explained over phone about what comfort care means and no further aggressive treatments are done when she is made comfort care; daughter states she understands it; RN confirmed it.  Also daughter wants to take her home with hospice; recommended stay overnight so that she can talk with  and ; she was adamant on wanting to take her home tonight. Discharge orders placed in; needs hospice eval

## 2025-07-04 NOTE — PLAN OF CARE
Problem: Safety - Adult  Goal: Free from fall injury  7/3/2025 2034 by Jeanie Dos Santos, RN  Outcome: Completed  7/3/2025 2034 by Jeanie Dos Santos, RN  Outcome: Adequate for Discharge     Problem: Chronic Conditions and Co-morbidities  Goal: Patient's chronic conditions and co-morbidity symptoms are monitored and maintained or improved  Outcome: Completed     Problem: Discharge Planning  Goal: Discharge to home or other facility with appropriate resources  Outcome: Completed     Problem: Neurosensory - Adult  Goal: Achieves stable or improved neurological status  Outcome: Completed  Flowsheets (Taken 7/3/2025 0715 by Elisa Marks)  Achieves stable or improved neurological status:   Assess for and report changes in neurological status   Maintain blood pressure and fluid volume within ordered parameters to optimize cerebral perfusion and minimize risk of hemorrhage  Goal: Absence of seizures  Outcome: Completed  Flowsheets (Taken 7/3/2025 0715 by Elisa Marks)  Absence of seizures:   Monitor for seizure activity.  If seizure occurs, document type and location of movements and any associated apnea   If seizure occurs, turn head to side and suction secretions as needed   Administer anticonvulsants as ordered   Support airway/breathing, administer oxygen as needed   Diagnostic studies as ordered  Goal: Achieves maximal functionality and self care  Outcome: Completed  Flowsheets (Taken 7/3/2025 0715 by Elisa Marks)  Achieves maximal functionality and self care: Monitor swallowing and airway patency with patient fatigue and changes in neurological status     Problem: Neurosensory - Adult  Goal: Absence of seizures  Outcome: Completed  Flowsheets (Taken 7/3/2025 0715 by Elisa Marks)  Absence of seizures:   Monitor for seizure activity.  If seizure occurs, document type and location of movements and any associated apnea   If seizure occurs, turn head to side and suction secretions as needed   Administer

## 2025-07-04 NOTE — PROGRESS NOTES
[facility] Hospitalist Progress Note    SYNOPSIS: Patient admitted on 2025 for Stroke determined by clinical assessment ([facility])     89 y.o. year old female  who  has a past medical history of Cerebral artery occlusion with cerebral infarction ([facility]), CHF (congestive heart failure) ([facility]), COPD (chronic obstructive pulmonary disease) ([facility]), Rheumatoid arthritis ([facility]), and Stroke ([facility]).      Patient presented to [facility] for stroke like symptoms  S/p TNK  Post TNK head ct done- stable  Mri brain pending  Transfering to floor  Neurology on board  Failed swallow eval  SUBJECTIVE:  Stable overnight. No other overnight issues reported.   Patient seen and examined  Records reviewed.           Temp (24hrs), Av.7 °F (36.5 °C), Min:97.4 °F (36.3 °C), Max:98 °F (36.7 °C)    DIET: Diet NPO  ADULT TUBE FEEDING; Nasoenteric; Standard with Fiber; Continuous; 15; Yes; 20; Q 4 hours; 35; 30; Q 4 hours  CODE: Full Code    Intake/Output Summary (Last 24 hours) at 7/3/2025 1256  Last data filed at 7/3/2025 1208  Gross per 24 hour   Intake 167.36 ml   Output --   Net 167.36 ml       Review of Systems  All bolded are positive; please see HPI  General:  Fever, chills, diaphoresis, fatigue, malaise, night sweats, weight loss  Psychological:  Anxiety, disorientation, hallucinations.  ENT:  Epistaxis, headaches, vertigo, visual changes.  Cardiovascular:  Chest pain, irregular heartbeats, palpitations, paroxysmal nocturnal dyspnea.  Respiratory:  Shortness of breath, coughing, sputum production, hemoptysis, wheezing, orthopnea.  Gastrointestinal:  Nausea, vomiting, diarrhea, heartburn, constipation, abdominal pain, hematemesis, hematochezia, melena, acholic stools  Genito-Urinary:  Dysuria, urgency, frequency, hematuria  Musculoskeletal:  Joint pain, joint stiffness, joint swelling, muscle pain  Neurology:  Headache, focal neurological deficits, weakness, numbness, paresthesia  Derm:  Rashes, ulcers, excoriations, 
  SPEECH/LANGUAGE PATHOLOGY  Clinical Re-Assessment of Swallow Function    PATIENT NAME:  Loreta Santana  (female)     MRN:  [id number]    :  1935  (89 y.o.)      TODAY'S DATE:  7/3/2025    EVALUATING THERAPIST: KAYLEE Salgado                 RESULTS:    DYSPHAGIA DIAGNOSIS:   Clinical indicators of moderate-severe pharyngeal phase dysphagia       DIET RECOMMENDATIONS:  NPO with ongoing PO analysis by SLP only to determine if PO diet can be initiated         FEEDING RECOMMENDATIONS:     Assistance level:  Not applicable      Compensatory strategies recommended: Thorough, frequent oral care to prevent colonization of oral bacteria      Discussed recommendations with:  Patient , caregiver/family , and patient nurse in person    PATIENT REPORT/COMPLAINT: patient currently NPO pending results of this evaluation  Current Diet Order:  Diet NPO  ADULT TUBE FEEDING; Nasoenteric; Standard with Fiber; Continuous; 15; Yes; 20; Q 4 hours; 35; 30; Q 4 hours    PRIOR LEVEL OF SWALLOW FUNCTION:    PAST HISTORY OF OROPHARYNGEAL DYSPHAGIA?: none reported    Home diet: Regular consistency solids (IDDSI level 7) with  thin liquids (IDDSI level 0)  Current Diet Order:  Diet NPO  ADULT TUBE FEEDING; Nasoenteric; Standard with Fiber; Continuous; 15; Yes; 20; Q 4 hours; 35; 30; Q 4 hours    PROCEDURE:  Consistencies Administered During the Evaluation   Liquids: thin liquid via tsp x 2 only    Solids:  Not appropriate      Method of Intake:   spoon  Fed by clinician      Position:   Sitting in bed with head elevated above 75 degrees    CLINICAL ASSESSMENT:  Oral Stage:       Inadequate labial seal resulting anterior labial spillage from midline      Pharyngeal Stage:    Throat clearing present after presentation of thin liquid  Immediate wet cough was noted after presentation of thin liquid  Wet respirations were noted after presentation of thin liquid  Wet/gurgly vocal quality was noted after presentation of thin liquid    Weak 
4 Eyes Skin Assessment     NAME:  Loreta Santana  YOB: 1935  MEDICAL RECORD NUMBER:  [id number]    The patient is being assessed for  Admission    I agree that at least one RN has performed a thorough Head to Toe Skin Assessment on the patient. ALL assessment sites listed below have been assessed.      Areas assessed by both nurses:    Head, Face, Ears, Shoulders, Back, Chest, Arms, Elbows, Hands, Sacrum. Buttock, Coccyx, Ischium, Legs. Feet and Heels, and Under Medical Devices   Bruising generalized, redness bilat buttocks        Does the Patient have a Wound? No noted wound(s)       Tru Prevention initiated by RN: Yes  Wound Care Orders initiated by RN: No    Pressure Injury (Stage 1,2,3,4, Unstageable, DTI, NWPT, and Complex wounds) if present, place Wound referral order by RN under : No    New Ostomies, if present place, Ostomy referral order under : No     Nurse 1 eSignature: Electronically signed by Cindy Solano RN on 7/2/25 at 4:52 PM EDT    **SHARE this note so that the co-signing nurse can place an eSignature**    Nurse 2 eSignature: {Esignature:[id number]}    
All patients answered regarding discharge instructions. Patient escorted out to family car via wheelchair with no complications.   
Attempted to place a small bowel feeding tube ,was unable to get tube past lungs and into esophagus . Patient was extremely uncooperative yelling \"stop this\" procedure  was stopped. Nursing aware.  
Daughter at bedside updated that unable to place small bore feeding tube, is requesting to speak with physician regarding next steps. Dr. Sutton notified.   
ER Clinical Pharmacy Note:    89 yo female presents to the ER @ 09:44 for stroke symptoms.    Last known well = 08:45  Arrival time = 09:44  POCT glucose = 203 mg/dL  NIH = 29  Weight = 36.3 kg   Blood pressure = 156/80 mmHg   Bolus dose = 9 mg IVB @ 10:21    Patient and/or family was counseled on the risks/benefits of giving TNK.  They agree and wish to proceed.    Romana Cotto, PharmD, BCIDP, BCPS 7/2/2025 11:11 AM  [phone]      
MRI screening needs to be completed.    Please make sure your patient can lay flat onto their back. (not kyphotic/SOB/contracted, etc) -if not patient cannot come down to MRI.    If it is stated on screening that they need medicated for claustrophobia/pain/holding still -have the doctor put med orders in.    If patient is a prisoner- check with patients security they have the correct MRI accommodations done as in correct flex/plastic cuffs. Also let MRI know on screening patient is a prisoner.    If patient is on a IV drip that they cannot come off of for MRI- please inform MRI so we can coordinate with the RN that will come down to switch to MRI safe pump.    Thank you from your MRI team.    
Ms. Santana is ordered MRI 24 hour post TNK administration.  According to her daughter, she did not tolerate a MRI ordered with her previous diagnosis of stroke.  She became very agitated during that study.  Due to her advanced age, pulmonary status & dysphagia, sedation is not recommended.   Will hold MRI for now.    
Nurse Handoff to BRENDAN BEEBE  
OCCUPATIONAL THERAPY INITIAL EVALUATION    [facility] [address] [place], [place]       Date:7/3/2025                                                  Patient Name: Loreta Santana  MRN: [id number]  : 1935  Room: [address] [place] [address]    Evaluating OT: Ivet Elkins, OTR/L 8622    Referring Provider:   Masha Peres MD        Specific Provider Orders/Date: OT evaluation and treatment (25)    Diagnosis:  Acute ischemic stroke (HCC) [I63.9]  Stroke determined by clinical assessment (HCC) [I63.9]  Cerebrovascular accident (CVA), unspecified mechanism (HCC) [I63.9]      Reason for Admission:   89 y.o. female who presents for strokelike symptoms. The patient was home with her daughter and she was awake alert and talking and walking and then sat down in the chair and went quiet, family said she was having difficulty talking with slurred speech and confusion.     Pertinent Medical History:  has a past medical history of Cerebral artery occlusion with cerebral infarction (HCC), CHF (congestive heart failure) (HCC), COPD (chronic obstructive pulmonary disease) (HCC), Rheumatoid arthritis (HCC), and Stroke (HCC).     Surgery/Procedure:  TNK       Precautions:  Fall Risk, alarms, dysarthric, cognition, restless activity, R  inattention     Assessment of current deficits   [x] Functional mobility  [x]ADLs  [x] Strength               [x]Cognition   [x] Functional transfers   [x] IADLs         [x] Safety Awareness   [x]Endurance   [] Fine Coordination        [x] ROM     [] Vision/perception   []Sensation    []Gross Motor Coordination [x] Balance   [] Delirium                  []Motor Control     [x] Communication    OT PLAN OF CARE      OT POC based on physician orders, patient diagnosis and results of clinical assessment.       Frequency/Duration: 1-3 days/wk for 1-2 weeks PRN    Specific OT Treatment to include:   ADL retraining/adapted 
On floor to place sbft and daughter requests we wait until after swallow study by speech.   
Patient admitted to [facility] 4522 with the following belongings: None - Patient arrived to the unit with no belongings.. The following belongings were sent home with the patient's family:  None - belongings noted on admission remain in patient's room..    
Patient arrived to room 4522 with ER nurse. Patient placed on ICU monitor.   
Patient family addiment patient be discharged home tonight. Call placed to a  to ensure patient would have all need when discharged home. Awaiting call back.     Call placed to [facility] that confirmed they obtained RX needed for patient and are able to see patient tonight.Family updated at bedside.     Discharge order in, IVs removed and discharge papers printed. Plan to escort patient to family car via wheelchair.   
Patient family member approached this RN stating that upon her cardiologist advisement via phone and other family members that she would like to take her mother home via ambulance with hospice [facility]. Dr. Sutton notified, new orders received.  Xavier Chin notified.   
Patients daughter (Joyce) was asked to wait in the waiting area will the ICU staff got her mom assessed. Daughter stated \"I will be staying with my mom 24/7 and will not leave her side.\" This nurse did explain that it would take 30 min to get the patient settled in. Daughter was agreeable. Once patient was assessed this nurse did go get patients daughter. Once in the room daughter preceded to state \"is no one coming in to explain anything to me. We should have went to [facility].\" This nurse did go into the patients room to reassure the daughter that she is in the ICU and this nurse had to let the physician know that patient was here.   
Physical Therapy  Physical Therapy Initial Assessment     Name: Loreta Santana  : 1935  MRN: [id number]      Date of Service: 7/3/2025    Evaluating PT:  Ki Bustos PT, DPT [id number]    Room #:  4522/4522-A  Diagnosis:  Acute ischemic stroke (HCC) [I63.9]  Stroke determined by clinical assessment (HCC) [I63.9]  Cerebrovascular accident (CVA), unspecified mechanism (HCC) [I63.9]  PMHx/PSHx:    Past Medical History:   Diagnosis Date    Cerebral artery occlusion with cerebral infarction (HCC)     CHF (congestive heart failure) (HCC)     COPD (chronic obstructive pulmonary disease) (HCC)     Rheumatoid arthritis (HCC)     Stroke (HCC)      Procedure/Surgery:   TNK  Precautions:  Falls, alarms, cognition, dysarthria, tangential/rambling, restless, R inattention  Equipment Needs:  TBD    SUBJECTIVE:    Pt lives with family in a 1 story home.      Pt ambulated with rollator and completed some ADLs independently PTA.  Pt's daughter also provided assistance.    OBJECTIVE:   Initial Evaluation  Date: 7/3/25 Treatment Short Term/ Long Term   Goals   AM-PAC 6 Clicks      Was pt agreeable to Eval/treatment? Yes     Does pt have pain? No c/o pain     Bed Mobility  Rolling: ModA  Supine to sit: MaxA  Sit to supine: MaxA  Scooting: MaxA  Saad   Transfers Sit to stand: MaxA x 2  Stand to sit: MaxA x 2  Stand pivot: NT  Saad with AAD   Ambulation   NT  >50 feet with Saad with AAD   Stair negotiation: ascended and descended NT     ROM BUE:  Defer to OT note  BLE:  WFL     Strength BUE:  Defer to OT note  BLE:  unable to formally assess  Increase by 1/3 MMT grade   Balance Sitting EOB:  MaxA  Dynamic Standing:  MaxA x 2 no device  Sitting EOB:  Independent  Dynamic Standing:  Saad with AAD     Pt is A & O x 1 self  CAM-ICU: NT  RASS: +2  Sensation:  unable to assess  Edema:  none    Vitals:  Heart Rate at rest 81 bpm Heart Rate post session 80 bpm   SpO2 at rest -% SpO2 post session -%   Blood Pressure at rest 140/68 
SPEECH/LANGUAGE PATHOLOGY  SPEECH/LANGUAGE/COGNITIVE EVALUATION   and PLAN OF CARE      PATIENT NAME:  Loreta Santana  (female)     MRN:  [id number]    :  1935  (89 y.o.)  STATUS:  Inpatient: Room 4522/4522-A    TODAY'S DATE:  7/3/2025  ORDER DATE, DESCRIPTION AND REFERRING PROVIDER :    SLP eval and treat  Start:  25 1245,   End:  25 124,   ONE TIME,   Standing Count:  1 Occurrences,   R       Masha Sutton MD  REASON FOR REFERRAL:  Stroke   EVALUATING THERAPIST: KAYLEE El    ADMITTING DIAGNOSIS: Acute ischemic stroke (HCC) [I63.9]  Stroke determined by clinical assessment (HCC) [I63.9]  Cerebrovascular accident (CVA), unspecified mechanism (HCC) [I63.9]    VISIT DIAGNOSIS:   Visit Diagnoses         Codes      Acute ischemic stroke (HCC)    -  Primary I63.9      Cerebrovascular accident (CVA), unspecified mechanism (HCC)     I63.9               SPEECH THERAPY  PLAN OF CARE   The speech therapy  POC is established based on physician order, speech pathology diagnosis and results of clinical assessment     SPEECH PATHOLOGY DIAGNOSIS:   Patient presents with severe dysarthria. SLP plans ongoing assessment of her cognitive linguistic skills. It should be noted that patient required verbal and tactile cues to maintain alertness throughout the informal assessment.     Speech Pathology intervention is recommended 1-3 times per week for LOS or when goals are met with emphasis on the following:      Conditions Requiring Skilled Therapeutic Intervention for speech, language and/or cognition    Receptive Aphasia  Expressive Aphasia   Dysarthria   Cognitive linguistic impairment  Decreased thought organization    Specific Speech Therapy Interventions to Include:   Confrontational Naming task training   Receptive language training   Expressive language training   Therapeutic tasks for Cognition  Therapeutic exercises for dysarthria    Specific instructions for next treatment:     To initiate 
Saqib served Dr. Sutton that patient was in room 4522 and that daughter had questions. Dr. Sutton called the unit and spoke with the daughter.   
Spiritual Health History and Assessment/Progress Note  [facility]    (P) Initial Encounter, Spiritual/Emotional Needs,  ,  ,      Name: Loreta Santana MRN: [id number]    Age: 89 y.o.     Sex: female   Language: English   Mormon: Sabianism   Stroke determined by clinical assessment ([facility])     Date: 7/3/2025                           Spiritual Assessment began in SE 4SE ICU-N        Referral/Consult From: (P) Rounding   Encounter Overview/Reason: (P) Initial Encounter, Spiritual/Emotional Needs  Service Provided For: (P) Patient, Family    Edith, Belief, Meaning:   Patient identifies as spiritual, is connected with a edith tradition or spiritual practice, and has beliefs or practices that help with coping during difficult times  Family/Friends identify as spiritual, are connected with a edith tradition or spiritual practice, and have beliefs or practices that help with coping during difficult times      Importance and Influence:  Patient has spiritual/personal beliefs that influence decisions regarding their health  Family/Friends have spiritual/personal beliefs that influence decisions regarding the patient's health    Community:  Patient is connected with a spiritual community  Family/Friends are connected with a spiritual community:    Assessment and Plan of Care:     Patient Interventions include: Other: Patient not able to converse  Family/Friends Interventions include: Facilitated expression of thoughts and feelings, Explored spiritual coping/struggle/distress, Engaged in theological reflection, and Affirmed coping skills/support systems    Patient Plan of Care: Spiritual Care available upon further referral  Family/Friends Plan of Care: Spiritual Care available upon further referral    Electronically signed by Chaplain Lalitha on 7/3/2025 at 2:48 PM    
mechanism ([facility]) [I63.9]    VISIT DIAGNOSIS:   Visit Diagnoses         Codes      Acute ischemic stroke (HCC)    -  Primary I63.9      Cerebrovascular accident (CVA), unspecified mechanism ([facility])     I63.9             PATIENT REPORT/COMPLAINT: patient not able to accurately report -- daughter reported that patient had difficulty swallowing in the past   RN cleared patient for participation in assessment     yes     PRIOR LEVEL OF SWALLOW FUNCTION:    PAST HISTORY OF OROPHARYNGEAL DYSPHAGIA?: yes -- refer above     Home diet: Regular consistency solids (IDDSI level 7) with  thin liquids (IDDSI level 0)  Current Diet Order:  Diet NPO    PROCEDURE:  Consistencies Administered During the Evaluation   Liquids: ice chips   Solids:  Pureed       Method of Intake:   spoon, coated spoon  Fed by clinician      Position:   Sitting in bed with head elevated above 75 degrees    CLINICAL ASSESSMENT:  Oral Stage:       Impaired oral initiation  Delayed A-P transit due to: decreased ability for initiation   , reduced lingual strength , and cognitive function   Lingual pumping present  Verbal stimulation to initiate swallow onset      Pharyngeal Stage:    Throat clearing present after presentation of thin liquid and pureed foods    Cognition:   Follows 1 - step directions appropriate for this assessment, Confusion noted, Language impaired, , Aphasic, and Needs frequent verbal cues to maintain adequate alertness    Oral Peripheral Examination   Generalized oral weakness    Current Respiratory Status    2 liters O2 via nasal cannula     Parameters of Speech Production  Respiration:  Adequate for speech production  Quality:   Wet/Gurgly  Intensity: Quiet    Volitional Swallow: verbal cue to elicit     Volitional Cough:  verbal cue to elicit     Pain: No pain reported.    EDUCATION:   The Speech Language Pathologist (SLP) completed education regarding results of evaluation and that intervention is warranted at this time.  Learner: Patient

## 2025-07-04 NOTE — DISCHARGE SUMMARY
Hospitalist Discharge Summary    Patient ID: Loreta Santana   Patient : 1935  Patient's PCP: Chuck Kwong MD    Admit Date: 2025   Admitting Physician: Masha Sutton MD    Discharge Date:  2025   Discharge Physician: Masha Sutton MD   Discharge Condition: UnStable  Discharge Disposition: Home hospice      Hospital course in brief:  (Please refer to daily progress notes for a comprehensive review of the hospitalization by requesting medical records)    89 y.o. year old female  who  has a past medical history of Cerebral artery occlusion with cerebral infarction ([facility]), CHF (congestive heart failure) ([facility]), COPD (chronic obstructive pulmonary disease) (HCC), Rheumatoid arthritis (HCC), and Stroke ([facility]).      Patient presented to [facility] for stroke like symptoms  S/p TNK  Post TNK head ct done- stable  Mri brain pending  Transfering to floor  Neurology on board  Failed swallow eval    6:45 pm  Patient daughter states that  she spoke with patient's cardiologist and after long discussion has decided to change code status to comfort care  I explained over phone about what comfort care means and no further aggressive treatments are done when she is made comfort care; daughter states she understands it; RN confirmed it.  Also daughter wants to take her home with hospice; recommended stay overnight so that she can talk with  and ; she was adamant on wanting to take her home tonight. Discharge orders placed in; needs hospice eval     Patient is getting [facility] with [facility]  Consults:   IP CONSULT TO INTERNAL MEDICINE  IP CONSULT TO CRITICAL CARE  IP CONSULT TO NEUROLOGY  IP CONSULT TO VASCULAR ACCESS TEAM  IP CONSULT TO DIETITIAN  IP CONSULT TO PALLIATIVE CARE  IP CONSULT TO HOSPICE    Discharge Diagnoses:        Discharge Instructions / Follow up:  Follow-up with PCP within 1 week of discharge.  Follow-up with consultants as indicated by them.  Compliance